# Patient Record
Sex: FEMALE | Race: WHITE | NOT HISPANIC OR LATINO | Employment: FULL TIME | URBAN - METROPOLITAN AREA
[De-identification: names, ages, dates, MRNs, and addresses within clinical notes are randomized per-mention and may not be internally consistent; named-entity substitution may affect disease eponyms.]

---

## 2013-05-17 LAB — HIV: NON REACTIVE

## 2019-02-07 ENCOUNTER — CLINICAL SUPPORT (OUTPATIENT)
Dept: FAMILY MEDICINE | Facility: CLINIC | Age: 39
End: 2019-02-07
Payer: COMMERCIAL

## 2019-09-16 ENCOUNTER — OFFICE VISIT (OUTPATIENT)
Dept: FAMILY MEDICINE | Facility: CLINIC | Age: 39
End: 2019-09-16
Payer: COMMERCIAL

## 2019-09-16 ENCOUNTER — LAB VISIT (OUTPATIENT)
Dept: LAB | Facility: HOSPITAL | Age: 39
End: 2019-09-16
Attending: FAMILY MEDICINE
Payer: COMMERCIAL

## 2019-09-16 VITALS
BODY MASS INDEX: 40.42 KG/M2 | HEART RATE: 83 BPM | OXYGEN SATURATION: 98 % | RESPIRATION RATE: 18 BRPM | SYSTOLIC BLOOD PRESSURE: 122 MMHG | DIASTOLIC BLOOD PRESSURE: 89 MMHG | WEIGHT: 219.63 LBS | HEIGHT: 62 IN

## 2019-09-16 DIAGNOSIS — Z23 NEED FOR VACCINATION: ICD-10-CM

## 2019-09-16 DIAGNOSIS — R73.9 HYPERGLYCEMIA: ICD-10-CM

## 2019-09-16 DIAGNOSIS — F41.9 ANXIETY: ICD-10-CM

## 2019-09-16 DIAGNOSIS — F41.9 ANXIETY: Primary | ICD-10-CM

## 2019-09-16 DIAGNOSIS — E06.3 HASHIMOTO'S THYROIDITIS: ICD-10-CM

## 2019-09-16 LAB
ALBUMIN SERPL BCP-MCNC: 3.9 G/DL (ref 3.5–5.2)
ALP SERPL-CCNC: 78 U/L (ref 55–135)
ALT SERPL W/O P-5'-P-CCNC: 38 U/L (ref 10–44)
ANION GAP SERPL CALC-SCNC: 9 MMOL/L (ref 8–16)
AST SERPL-CCNC: 22 U/L (ref 10–40)
BASOPHILS # BLD AUTO: 0.06 K/UL (ref 0–0.2)
BASOPHILS NFR BLD: 0.7 % (ref 0–1.9)
BILIRUB SERPL-MCNC: 0.6 MG/DL (ref 0.1–1)
BUN SERPL-MCNC: 14 MG/DL (ref 6–20)
CALCIUM SERPL-MCNC: 8.6 MG/DL (ref 8.7–10.5)
CHLORIDE SERPL-SCNC: 105 MMOL/L (ref 95–110)
CHOLEST SERPL-MCNC: 192 MG/DL (ref 120–199)
CHOLEST/HDLC SERPL: 4.3 {RATIO} (ref 2–5)
CO2 SERPL-SCNC: 22 MMOL/L (ref 23–29)
CREAT SERPL-MCNC: 0.8 MG/DL (ref 0.5–1.4)
DIFFERENTIAL METHOD: NORMAL
EOSINOPHIL # BLD AUTO: 0.2 K/UL (ref 0–0.5)
EOSINOPHIL NFR BLD: 2.6 % (ref 0–8)
ERYTHROCYTE [DISTWIDTH] IN BLOOD BY AUTOMATED COUNT: 11.9 % (ref 11.5–14.5)
EST. GFR  (AFRICAN AMERICAN): >60 ML/MIN/1.73 M^2
EST. GFR  (NON AFRICAN AMERICAN): >60 ML/MIN/1.73 M^2
ESTIMATED AVG GLUCOSE: 97 MG/DL (ref 68–131)
GLUCOSE SERPL-MCNC: 96 MG/DL (ref 70–110)
HBA1C MFR BLD HPLC: 5 % (ref 4.5–6.2)
HCT VFR BLD AUTO: 41.6 % (ref 37–48.5)
HDLC SERPL-MCNC: 45 MG/DL (ref 40–75)
HDLC SERPL: 23.4 % (ref 20–50)
HGB BLD-MCNC: 14.4 G/DL (ref 12–16)
IMM GRANULOCYTES # BLD AUTO: 0.03 K/UL (ref 0–0.04)
IMM GRANULOCYTES NFR BLD AUTO: 0.4 % (ref 0–0.5)
LDLC SERPL CALC-MCNC: 102.4 MG/DL (ref 63–159)
LYMPHOCYTES # BLD AUTO: 1.9 K/UL (ref 1–4.8)
LYMPHOCYTES NFR BLD: 23.8 % (ref 18–48)
MCH RBC QN AUTO: 30.8 PG (ref 27–31)
MCHC RBC AUTO-ENTMCNC: 34.6 G/DL (ref 32–36)
MCV RBC AUTO: 89 FL (ref 82–98)
MONOCYTES # BLD AUTO: 0.5 K/UL (ref 0.3–1)
MONOCYTES NFR BLD: 5.7 % (ref 4–15)
NEUTROPHILS # BLD AUTO: 5.4 K/UL (ref 1.8–7.7)
NEUTROPHILS NFR BLD: 66.8 % (ref 38–73)
NONHDLC SERPL-MCNC: 147 MG/DL
NRBC BLD-RTO: 0 /100 WBC
PLATELET # BLD AUTO: 326 K/UL (ref 150–350)
PMV BLD AUTO: 9.5 FL (ref 9.2–12.9)
POTASSIUM SERPL-SCNC: 4.1 MMOL/L (ref 3.5–5.1)
PROT SERPL-MCNC: 7.1 G/DL (ref 6–8.4)
RBC # BLD AUTO: 4.68 M/UL (ref 4–5.4)
SODIUM SERPL-SCNC: 136 MMOL/L (ref 136–145)
T4 FREE SERPL-MCNC: 0.8 NG/DL (ref 0.71–1.51)
TRIGL SERPL-MCNC: 223 MG/DL (ref 30–150)
TSH SERPL DL<=0.005 MIU/L-ACNC: 7.58 UIU/ML (ref 0.34–5.6)
WBC # BLD AUTO: 8.1 K/UL (ref 3.9–12.7)

## 2019-09-16 PROCEDURE — 36415 COLL VENOUS BLD VENIPUNCTURE: CPT

## 2019-09-16 PROCEDURE — 90686 IIV4 VACC NO PRSV 0.5 ML IM: CPT | Mod: S$GLB,,, | Performed by: FAMILY MEDICINE

## 2019-09-16 PROCEDURE — 99999 PR PBB SHADOW E&M-EST. PATIENT-LVL III: CPT | Mod: PBBFAC,,, | Performed by: FAMILY MEDICINE

## 2019-09-16 PROCEDURE — 99204 PR OFFICE/OUTPT VISIT, NEW, LEVL IV, 45-59 MIN: ICD-10-PCS | Mod: 25,S$GLB,, | Performed by: FAMILY MEDICINE

## 2019-09-16 PROCEDURE — 86376 MICROSOMAL ANTIBODY EACH: CPT

## 2019-09-16 PROCEDURE — 84439 ASSAY OF FREE THYROXINE: CPT

## 2019-09-16 PROCEDURE — 84443 ASSAY THYROID STIM HORMONE: CPT

## 2019-09-16 PROCEDURE — 90471 FLU VACCINE (QUAD) GREATER THAN OR EQUAL TO 3YO PRESERVATIVE FREE IM: ICD-10-PCS | Mod: S$GLB,,, | Performed by: FAMILY MEDICINE

## 2019-09-16 PROCEDURE — 80061 LIPID PANEL: CPT

## 2019-09-16 PROCEDURE — 3008F BODY MASS INDEX DOCD: CPT | Mod: S$GLB,,, | Performed by: FAMILY MEDICINE

## 2019-09-16 PROCEDURE — 3008F PR BODY MASS INDEX (BMI) DOCUMENTED: ICD-10-PCS | Mod: S$GLB,,, | Performed by: FAMILY MEDICINE

## 2019-09-16 PROCEDURE — 90686 FLU VACCINE (QUAD) GREATER THAN OR EQUAL TO 3YO PRESERVATIVE FREE IM: ICD-10-PCS | Mod: S$GLB,,, | Performed by: FAMILY MEDICINE

## 2019-09-16 PROCEDURE — 99999 PR PBB SHADOW E&M-EST. PATIENT-LVL III: ICD-10-PCS | Mod: PBBFAC,,, | Performed by: FAMILY MEDICINE

## 2019-09-16 PROCEDURE — 83036 HEMOGLOBIN GLYCOSYLATED A1C: CPT

## 2019-09-16 PROCEDURE — 80053 COMPREHEN METABOLIC PANEL: CPT

## 2019-09-16 PROCEDURE — 85025 COMPLETE CBC W/AUTO DIFF WBC: CPT

## 2019-09-16 PROCEDURE — 90471 IMMUNIZATION ADMIN: CPT | Mod: S$GLB,,, | Performed by: FAMILY MEDICINE

## 2019-09-16 PROCEDURE — 99204 OFFICE O/P NEW MOD 45 MIN: CPT | Mod: 25,S$GLB,, | Performed by: FAMILY MEDICINE

## 2019-09-16 RX ORDER — LEVOTHYROXINE SODIUM 112 UG/1
112 TABLET ORAL DAILY
Qty: 90 TABLET | Refills: 3 | Status: SHIPPED | OUTPATIENT
Start: 2019-09-16 | End: 2020-09-22 | Stop reason: SDUPTHER

## 2019-09-16 RX ORDER — BUPROPION HYDROCHLORIDE 150 MG/1
150 TABLET ORAL DAILY
Qty: 90 TABLET | Refills: 3 | Status: SHIPPED | OUTPATIENT
Start: 2019-09-16 | End: 2020-09-22 | Stop reason: SDUPTHER

## 2019-09-16 RX ORDER — LEVOTHYROXINE SODIUM 112 UG/1
112 TABLET ORAL DAILY
Refills: 1 | COMMUNITY
Start: 2019-06-22 | End: 2019-09-16 | Stop reason: SDUPTHER

## 2019-09-16 NOTE — PROGRESS NOTES
"Subjective:       Patient ID: Margo Griffin is a 39 y.o. female.    Chief Complaint: Establish Care (would like BW, Pap completed w/Dr. Arango); Anxiety; and Flu Vaccine    Establish care    Anxiety  Previously on wellbutrin  Stopped about 3 months ago  No si/hi  Started new job and anxiety is worse    Hypothyroidism  Hashimoto's  Has not taken meds in two months        Review of Systems   Constitutional: Negative for activity change, appetite change, chills, fatigue and fever.   HENT: Negative for congestion, dental problem, facial swelling, nosebleeds, postnasal drip, sinus pain, sore throat, trouble swallowing and voice change.    Eyes: Negative for pain, discharge and visual disturbance.   Respiratory: Negative for apnea, cough, chest tightness and shortness of breath.    Cardiovascular: Negative for chest pain and palpitations.   Gastrointestinal: Negative for abdominal pain, blood in stool, constipation and nausea.   Endocrine: Negative for cold intolerance, polydipsia and polyuria.   Genitourinary: Negative for difficulty urinating, enuresis and flank pain.   Musculoskeletal: Negative for arthralgias and back pain.   Skin: Negative for color change.   Allergic/Immunologic: Negative for environmental allergies and immunocompromised state.   Neurological: Negative for dizziness and light-headedness.   Hematological: Negative for adenopathy.   Psychiatric/Behavioral: Positive for dysphoric mood. Negative for agitation, behavioral problems and decreased concentration. The patient is nervous/anxious.    All other systems reviewed and are negative.        Reviewed family, medical, surgical, and social history.    Objective:      /89 (BP Location: Left arm, Patient Position: Sitting, BP Method: Medium (Automatic))   Pulse 83   Resp 18   Ht 5' 2" (1.575 m)   Wt 99.6 kg (219 lb 9.6 oz)   SpO2 98%   BMI 40.17 kg/m²   Physical Exam   Constitutional: She is oriented to person, place, and time. She appears " well-developed and well-nourished. No distress.   HENT:   Head: Normocephalic and atraumatic.   Nose: Nose normal.   Mouth/Throat: Oropharynx is clear and moist. No oropharyngeal exudate.   Eyes: Pupils are equal, round, and reactive to light. Conjunctivae and EOM are normal. No scleral icterus.   Neck: Normal range of motion. Neck supple. No thyromegaly present.   Cardiovascular: Normal rate, regular rhythm and normal heart sounds. Exam reveals no gallop and no friction rub.   No murmur heard.  Pulmonary/Chest: Effort normal and breath sounds normal. No respiratory distress. She has no wheezes. She has no rales. She exhibits no tenderness.   Abdominal: Soft. Bowel sounds are normal. She exhibits no distension. There is no tenderness. There is no guarding.   Musculoskeletal: Normal range of motion. She exhibits no edema, tenderness or deformity.   Lymphadenopathy:     She has no cervical adenopathy.   Neurological: She is alert and oriented to person, place, and time. She displays normal reflexes. No cranial nerve deficit or sensory deficit. She exhibits normal muscle tone.   Skin: Skin is warm and dry. No rash noted. She is not diaphoretic. No erythema. No pallor.   Psychiatric: She has a normal mood and affect. Her behavior is normal. Judgment and thought content normal.   Nursing note and vitals reviewed.      Assessment:       1. Anxiety    2. Hashimoto's thyroiditis    3. Hyperglycemia    4. Need for vaccination        Plan:       Anxiety  -     Influenza - Quadrivalent (PF)  -     buPROPion (WELLBUTRIN XL) 150 MG TB24 tablet; Take 1 tablet (150 mg total) by mouth once daily.  Dispense: 90 tablet; Refill: 3  -     levothyroxine (SYNTHROID) 112 MCG tablet; Take 1 tablet (112 mcg total) by mouth once daily.  Dispense: 90 tablet; Refill: 3  -     CBC auto differential; Future; Expected date: 09/16/2019  -     Comprehensive metabolic panel; Future; Expected date: 09/16/2019  -     Microalbumin/creatinine urine  ratio; Future  -     Lipid panel; Future; Expected date: 09/16/2019  -     TSH; Future; Expected date: 09/16/2019  -     T4, free; Future; Expected date: 09/16/2019  -     Hemoglobin A1c; Future; Expected date: 09/16/2019  -     Thyroid peroxidase antibody; Future; Expected date: 09/16/2019    Hashimoto's thyroiditis  -     Influenza - Quadrivalent (PF)  -     buPROPion (WELLBUTRIN XL) 150 MG TB24 tablet; Take 1 tablet (150 mg total) by mouth once daily.  Dispense: 90 tablet; Refill: 3  -     levothyroxine (SYNTHROID) 112 MCG tablet; Take 1 tablet (112 mcg total) by mouth once daily.  Dispense: 90 tablet; Refill: 3  -     CBC auto differential; Future; Expected date: 09/16/2019  -     Comprehensive metabolic panel; Future; Expected date: 09/16/2019  -     Microalbumin/creatinine urine ratio; Future  -     Lipid panel; Future; Expected date: 09/16/2019  -     TSH; Future; Expected date: 09/16/2019  -     T4, free; Future; Expected date: 09/16/2019  -     Hemoglobin A1c; Future; Expected date: 09/16/2019  -     Thyroid peroxidase antibody; Future; Expected date: 09/16/2019    Hyperglycemia  -     Influenza - Quadrivalent (PF)  -     buPROPion (WELLBUTRIN XL) 150 MG TB24 tablet; Take 1 tablet (150 mg total) by mouth once daily.  Dispense: 90 tablet; Refill: 3  -     levothyroxine (SYNTHROID) 112 MCG tablet; Take 1 tablet (112 mcg total) by mouth once daily.  Dispense: 90 tablet; Refill: 3  -     CBC auto differential; Future; Expected date: 09/16/2019  -     Comprehensive metabolic panel; Future; Expected date: 09/16/2019  -     Microalbumin/creatinine urine ratio; Future  -     Lipid panel; Future; Expected date: 09/16/2019  -     TSH; Future; Expected date: 09/16/2019  -     T4, free; Future; Expected date: 09/16/2019  -     Hemoglobin A1c; Future; Expected date: 09/16/2019  -     Thyroid peroxidase antibody; Future; Expected date: 09/16/2019    Need for vaccination  -     Influenza - Quadrivalent (PF)  -      buPROPion (WELLBUTRIN XL) 150 MG TB24 tablet; Take 1 tablet (150 mg total) by mouth once daily.  Dispense: 90 tablet; Refill: 3  -     levothyroxine (SYNTHROID) 112 MCG tablet; Take 1 tablet (112 mcg total) by mouth once daily.  Dispense: 90 tablet; Refill: 3  -     CBC auto differential; Future; Expected date: 09/16/2019  -     Comprehensive metabolic panel; Future; Expected date: 09/16/2019  -     Microalbumin/creatinine urine ratio; Future  -     Lipid panel; Future; Expected date: 09/16/2019  -     TSH; Future; Expected date: 09/16/2019  -     T4, free; Future; Expected date: 09/16/2019  -     Hemoglobin A1c; Future; Expected date: 09/16/2019  -     Thyroid peroxidase antibody; Future; Expected date: 09/16/2019    Restart meds  Recheck labs  Adjust as necessary        Risks, benefits, and side effects were discussed with the patient. All questions were answered to the fullest satisfaction of the patient, and pt verbalized understanding and agreement to treatment plan. Pt was to call with any new or worsening symptoms, or present to the ER.

## 2019-09-17 LAB — THYROPEROXIDASE IGG SERPL-ACNC: 209.9 IU/ML

## 2019-09-19 DIAGNOSIS — Z12.39 BREAST CANCER SCREENING: ICD-10-CM

## 2019-09-24 ENCOUNTER — TELEPHONE (OUTPATIENT)
Dept: FAMILY MEDICINE | Facility: CLINIC | Age: 39
End: 2019-09-24

## 2019-09-24 NOTE — TELEPHONE ENCOUNTER
Called pt x3, no answer, no VM. Dial tone changed to busy signal x3.        ----- Message from Laura Berrios sent at 9/24/2019 10:41 AM CDT -----  Type:  Patient Returning Call    Who Called:  Self   Who Left Message for Patient:    Does the patient know what this is regarding?:    Best Call Back Number:  [phone] Additional Information:

## 2019-09-30 ENCOUNTER — TELEPHONE (OUTPATIENT)
Dept: FAMILY MEDICINE | Facility: CLINIC | Age: 39
End: 2019-09-30

## 2019-09-30 NOTE — TELEPHONE ENCOUNTER
Called pt x3, no answer, no VM.      ----- Message from Consuelo Stringer sent at 9/30/2019  1:12 PM CDT -----  Contact: self  Placed call to pod, patient miss call from your office please call back at [phone] (home)

## 2019-10-07 ENCOUNTER — TELEPHONE (OUTPATIENT)
Dept: FAMILY MEDICINE | Facility: CLINIC | Age: 39
End: 2019-10-07

## 2020-01-22 ENCOUNTER — PATIENT OUTREACH (OUTPATIENT)
Dept: ADMINISTRATIVE | Facility: HOSPITAL | Age: 40
End: 2020-01-22

## 2020-01-22 RX ORDER — METFORMIN HYDROCHLORIDE 500 MG/1
500 TABLET, EXTENDED RELEASE ORAL
Qty: 90 TABLET | Refills: 3 | Status: SHIPPED | OUTPATIENT
Start: 2020-01-22 | End: 2020-09-22 | Stop reason: SDUPTHER

## 2020-01-22 NOTE — LETTER
FAX      AUTHORIZATION FOR RELEASE OF   CONFIDENTIAL INFORMATION            Dr. Arango      We are seeing Margo Griffin, date of birth 1980, in the clinic at [facility]. Brijesh Olivera MD is the patient's PCP. Margo Griffin has an outstanding lab/procedure at the time we reviewed her chart. In order to help keep her health information updated, she has authorized us to request the following medical record(s):        ( x )  MAMMOGRAM                                      (  )  COLONOSCOPY      ( x )  PAP SMEAR                                          (  )  OUTSIDE LAB RESULTS     (  )  DEXA SCAN                                          (  )  DIABETIC EYE EXAM            (  )  DIABETIC FOOT EXAM                        (  )  ENTIRE RECORD     (  )  OUTSIDE IMMUNIZATIONS                 (  )  _______________        Please fax records to [facility]  [phone]     If you have any questions, please contact Myranda at [phone].      Myranda Bray L.P.N. Clinical Care Coordinator  [address] [place] [address], [place] [address] [phone]

## 2020-05-06 ENCOUNTER — PATIENT OUTREACH (OUTPATIENT)
Dept: ADMINISTRATIVE | Facility: HOSPITAL | Age: 40
End: 2020-05-06

## 2020-05-06 ENCOUNTER — TELEPHONE (OUTPATIENT)
Dept: FAMILY MEDICINE | Facility: CLINIC | Age: 40
End: 2020-05-06

## 2020-05-08 ENCOUNTER — OFFICE VISIT (OUTPATIENT)
Dept: FAMILY MEDICINE | Facility: CLINIC | Age: 40
End: 2020-05-08
Payer: COMMERCIAL

## 2020-05-08 ENCOUNTER — TELEPHONE (OUTPATIENT)
Dept: FAMILY MEDICINE | Facility: CLINIC | Age: 40
End: 2020-05-08

## 2020-05-08 VITALS
SYSTOLIC BLOOD PRESSURE: 128 MMHG | HEART RATE: 86 BPM | RESPIRATION RATE: 16 BRPM | HEIGHT: 62 IN | DIASTOLIC BLOOD PRESSURE: 82 MMHG | OXYGEN SATURATION: 98 % | BODY MASS INDEX: 40.12 KG/M2 | WEIGHT: 218 LBS

## 2020-05-08 DIAGNOSIS — E06.3 HASHIMOTO'S THYROIDITIS: Primary | ICD-10-CM

## 2020-05-08 PROCEDURE — 99213 OFFICE O/P EST LOW 20 MIN: CPT | Mod: S$GLB,,, | Performed by: FAMILY MEDICINE

## 2020-05-08 PROCEDURE — 99999 PR PBB SHADOW E&M-EST. PATIENT-LVL III: ICD-10-PCS | Mod: PBBFAC,,, | Performed by: FAMILY MEDICINE

## 2020-05-08 PROCEDURE — 99213 PR OFFICE/OUTPT VISIT, EST, LEVL III, 20-29 MIN: ICD-10-PCS | Mod: S$GLB,,, | Performed by: FAMILY MEDICINE

## 2020-05-08 PROCEDURE — 3008F BODY MASS INDEX DOCD: CPT | Mod: S$GLB,,, | Performed by: FAMILY MEDICINE

## 2020-05-08 PROCEDURE — 3008F PR BODY MASS INDEX (BMI) DOCUMENTED: ICD-10-PCS | Mod: S$GLB,,, | Performed by: FAMILY MEDICINE

## 2020-05-08 PROCEDURE — 99999 PR PBB SHADOW E&M-EST. PATIENT-LVL III: CPT | Mod: PBBFAC,,, | Performed by: FAMILY MEDICINE

## 2020-05-08 NOTE — PROGRESS NOTES
"Subjective:       Patient ID: Margo Griffin is a 40 y.o. female.    Chief Complaint: Thyroid Problem    Thyroid issues  Worsening  Changes in skin  Changes in energy level  Otherwise feels fine    Review of Systems   Constitutional: Positive for activity change. Negative for appetite change, chills, fatigue and fever.   HENT: Negative for congestion, dental problem, facial swelling, nosebleeds, postnasal drip, sinus pain, sore throat, trouble swallowing and voice change.    Eyes: Negative for pain, discharge and visual disturbance.   Respiratory: Negative for apnea, cough, chest tightness and shortness of breath.    Cardiovascular: Negative for chest pain and palpitations.   Gastrointestinal: Negative for abdominal pain, blood in stool, constipation and nausea.   Endocrine: Negative for cold intolerance, polydipsia and polyuria.   Genitourinary: Negative for difficulty urinating, enuresis and flank pain.   Musculoskeletal: Negative for arthralgias and back pain.   Skin: Negative for color change.   Allergic/Immunologic: Negative for environmental allergies and immunocompromised state.   Neurological: Negative for dizziness and light-headedness.   Hematological: Negative for adenopathy.   Psychiatric/Behavioral: Negative for agitation, behavioral problems, decreased concentration and dysphoric mood. The patient is not nervous/anxious.    All other systems reviewed and are negative.        Reviewed family, medical, surgical, and social history.    Objective:      /82 (BP Location: Left arm, Patient Position: Sitting, BP Method: Medium (Automatic))   Pulse 86   Resp 16   Ht 5' 2" (1.575 m)   Wt 98.9 kg (218 lb)   SpO2 98%   BMI 39.87 kg/m²   Physical Exam   Constitutional: She is oriented to person, place, and time. She appears well-developed and well-nourished. No distress.   HENT:   Head: Normocephalic and atraumatic.   Nose: Nose normal.   Mouth/Throat: Oropharynx is clear and moist. No oropharyngeal " exudate.   Eyes: Pupils are equal, round, and reactive to light. Conjunctivae and EOM are normal. No scleral icterus.   Neck: Normal range of motion. Neck supple. No thyromegaly present.   Cardiovascular: Normal rate, regular rhythm and normal heart sounds. Exam reveals no gallop and no friction rub.   No murmur heard.  Pulmonary/Chest: Effort normal and breath sounds normal. No respiratory distress. She has no wheezes. She has no rales. She exhibits no tenderness.   Abdominal: Soft. Bowel sounds are normal. She exhibits no distension. There is no tenderness. There is no guarding.   Musculoskeletal: Normal range of motion. She exhibits no edema, tenderness or deformity.   Lymphadenopathy:     She has no cervical adenopathy.   Neurological: She is alert and oriented to person, place, and time. She displays normal reflexes. No cranial nerve deficit or sensory deficit. She exhibits normal muscle tone.   Skin: Skin is warm and dry. No rash noted. She is not diaphoretic. No erythema. No pallor.   Psychiatric: She has a normal mood and affect. Her behavior is normal. Judgment and thought content normal.   Nursing note and vitals reviewed.      Assessment:       1. Hashimoto's thyroiditis        Plan:       Hashimoto's thyroiditis  -     T4, free; Future; Expected date: 05/08/2020  -     TSH; Future; Expected date: 05/08/2020  -     US Soft Tissue Head Neck Thyroid; Future; Expected date: 05/08/2020            Risks, benefits, and side effects were discussed with the patient. All questions were answered to the fullest satisfaction of the patient, and pt verbalized understanding and agreement to treatment plan. Pt was to call with any new or worsening symptoms, or present to the ER.

## 2020-05-08 NOTE — TELEPHONE ENCOUNTER
----- Message from Joyce Quintero sent at 5/8/2020 12:10 PM CDT -----  Contact: Patient  Type:  Patient Returning Call    Who Called:  Patient  Who Left Message for Patient:  Carmita newman  Does the patient know what this is regarding?:  yes  Best Call Back Number:    Additional Information:  Call to pod, no answer. Patient is wanting to confirm she will be there for 1

## 2020-05-12 ENCOUNTER — LAB VISIT (OUTPATIENT)
Dept: LAB | Facility: HOSPITAL | Age: 40
End: 2020-05-12
Attending: FAMILY MEDICINE
Payer: COMMERCIAL

## 2020-05-12 ENCOUNTER — HOSPITAL ENCOUNTER (OUTPATIENT)
Dept: RADIOLOGY | Facility: HOSPITAL | Age: 40
Discharge: HOME | End: 2020-05-12
Attending: FAMILY MEDICINE
Payer: COMMERCIAL

## 2020-05-12 DIAGNOSIS — E06.3 HASHIMOTO'S THYROIDITIS: ICD-10-CM

## 2020-05-12 LAB
T4 FREE SERPL-MCNC: 1.22 NG/DL (ref 0.71–1.51)
TSH SERPL DL<=0.005 MIU/L-ACNC: 0.38 UIU/ML (ref 0.34–5.6)

## 2020-05-12 PROCEDURE — 76536 US EXAM OF HEAD AND NECK: CPT | Mod: TC,PN

## 2020-05-12 PROCEDURE — 76536 US EXAM OF HEAD AND NECK: CPT | Mod: 26,,, | Performed by: RADIOLOGY

## 2020-05-12 PROCEDURE — 36415 COLL VENOUS BLD VENIPUNCTURE: CPT

## 2020-05-12 PROCEDURE — 76536 US SOFT TISSUE HEAD NECK THYROID: ICD-10-PCS | Mod: 26,,, | Performed by: RADIOLOGY

## 2020-05-12 PROCEDURE — 84443 ASSAY THYROID STIM HORMONE: CPT

## 2020-05-12 PROCEDURE — 84439 ASSAY OF FREE THYROXINE: CPT

## 2020-05-13 ENCOUNTER — TELEPHONE (OUTPATIENT)
Dept: FAMILY MEDICINE | Facility: CLINIC | Age: 40
End: 2020-05-13

## 2020-05-13 DIAGNOSIS — E06.3 HASHIMOTO'S THYROIDITIS: Primary | ICD-10-CM

## 2020-05-13 NOTE — TELEPHONE ENCOUNTER
----- Message from Esha Brambila MA sent at 5/13/2020  3:03 PM CDT -----  Patient was informed of result note and verbalized understanding. She also is okay with being set up with an endocrinologist.

## 2020-06-23 ENCOUNTER — PATIENT OUTREACH (OUTPATIENT)
Dept: ADMINISTRATIVE | Facility: OTHER | Age: 40
End: 2020-06-23

## 2020-06-25 ENCOUNTER — OFFICE VISIT (OUTPATIENT)
Dept: ENDOCRINOLOGY | Facility: CLINIC | Age: 40
End: 2020-06-25
Payer: COMMERCIAL

## 2020-06-25 VITALS
TEMPERATURE: 98 F | SYSTOLIC BLOOD PRESSURE: 118 MMHG | HEART RATE: 97 BPM | HEIGHT: 62 IN | BODY MASS INDEX: 39.86 KG/M2 | DIASTOLIC BLOOD PRESSURE: 82 MMHG | WEIGHT: 216.63 LBS

## 2020-06-25 DIAGNOSIS — R53.83 FATIGUE, UNSPECIFIED TYPE: ICD-10-CM

## 2020-06-25 DIAGNOSIS — E04.2 MULTINODULAR GOITER: Primary | ICD-10-CM

## 2020-06-25 DIAGNOSIS — E06.3 HASHIMOTO'S THYROIDITIS: ICD-10-CM

## 2020-06-25 DIAGNOSIS — E66.9 CLASS 2 OBESITY WITHOUT SERIOUS COMORBIDITY WITH BODY MASS INDEX (BMI) OF 39.0 TO 39.9 IN ADULT, UNSPECIFIED OBESITY TYPE: ICD-10-CM

## 2020-06-25 PROBLEM — E66.812 CLASS 2 OBESITY WITHOUT SERIOUS COMORBIDITY WITH BODY MASS INDEX (BMI) OF 39.0 TO 39.9 IN ADULT: Status: ACTIVE | Noted: 2020-06-25

## 2020-06-25 PROCEDURE — 99204 OFFICE O/P NEW MOD 45 MIN: CPT | Mod: S$GLB,,, | Performed by: INTERNAL MEDICINE

## 2020-06-25 PROCEDURE — 99204 PR OFFICE/OUTPT VISIT, NEW, LEVL IV, 45-59 MIN: ICD-10-PCS | Mod: S$GLB,,, | Performed by: INTERNAL MEDICINE

## 2020-06-25 PROCEDURE — 99999 PR PBB SHADOW E&M-EST. PATIENT-LVL IV: CPT | Mod: PBBFAC,,, | Performed by: INTERNAL MEDICINE

## 2020-06-25 PROCEDURE — 99999 PR PBB SHADOW E&M-EST. PATIENT-LVL IV: ICD-10-PCS | Mod: PBBFAC,,, | Performed by: INTERNAL MEDICINE

## 2020-06-25 NOTE — ASSESSMENT & PLAN NOTE
Pt with trouble swallowing sometimes for a few months.   - US with multiple nodules, most <1cm   - largest 1.3 cm isthmus, on personal review of the images it appears mostly isoechoic.   - discussed with patient this nodule doesn't meet FNA criteria yet, would need FNA if over 1.5 cm   - as well, with hashimoto's the thyroid will be very heterogenous, some of the ill-defined nodules may not even be there next year   - so, plan to repeat US after 1 year. Or sooner if patient feels swallowing symptoms are getting worse. Discussed that while it's less likely a nodule of that size would cause compression symptoms, since it is in the isthmus if it's growing that could be a culprit. However at this time, suspicion isn't high enough to recommend a surgery.   - symptoms could also be related to post-nasal drip/reflux

## 2020-06-25 NOTE — ASSESSMENT & PLAN NOTE
TPO elevated 2019   - on levothyroxine 112   - last TSH, free T4 normal   - clinically, doesn't have consistent symptoms pointing towards hyperthyroid or hypothyroid   - continue same dose, recheck in 3 months to ensure remains stable (TSH is pretty low, with pt reporting anxiety hx, sometimes diarrhea and feeling hot want to be sure she doesn't move towards iatrogenic hyperthyroidism)

## 2020-06-25 NOTE — PROGRESS NOTES
Subjective:      Chief Complaint: Thyroid Nodule and Hypothyroidism      HPI: Margo Griffin is a 40 y.o. female who is here for an initial evaluation for thyroid.    Pt noted trouble swallowing. Intermittent, not really getting better/worse, mostly the same.     Had US with PCP which showed nodules prompting referral.   US 5/12/2020: small nodules bilaterally   - Largest 1.3x0.47x0.98 cm in the isthmus. Heterogenous nodule, mostly isoechoic but some hypoechoic appearing areas.    Also has hx hypothyroid since around 2018 or 2019.   TPO elevated 9/2019    Lab Results   Component Value Date    TSH 0.382 05/12/2020    FREET4 1.22 05/12/2020     On 112 mcg/day levothyroxine. No missed doses/running out.    Today, pt reports still having the swallowing trouble.  Skin changes. Decreased mood, weight gain. Occasional diarrhea. No constipation. Brain fog. More forgetful the last few months. Does have reflux symptoms fairly often. Also gets postnasal drip seasonally.    Reviewed past medical, family, social history and updated as appropriate.    Review of Systems   Constitutional: Positive for fatigue and unexpected weight change.   HENT: Positive for trouble swallowing.    Eyes: Negative for visual disturbance.   Respiratory: Negative for shortness of breath.         Some trouble breathing when laying flat   Cardiovascular: Negative for palpitations.   Gastrointestinal: Positive for diarrhea. Negative for constipation.   Endocrine: Negative for cold intolerance.        Hot flashes   Genitourinary: Negative for frequency. Menstrual problem: had IUD, minimal cycles lately.   Neurological: Positive for numbness (sometimes in arm/feet). Negative for light-headedness.        Brain fog   Psychiatric/Behavioral: Negative for sleep disturbance.        Sometimes panic attacks     Objective:     Vitals:    06/25/20 0808   BP: 118/82   Pulse: 97   Temp: 98.3 °F (36.8 °C)     BP Readings from Last 5 Encounters:   06/25/20 118/82    05/08/20 128/82   09/16/19 122/89     Physical Exam  Vitals signs reviewed.   Constitutional:       Appearance: She is well-developed. She is obese.   HENT:      Head: Normocephalic and atraumatic.   Neck:      Musculoskeletal: Normal range of motion and neck supple.      Thyroid: No thyromegaly.      Comments: Isthmus nodule, nontender  Cardiovascular:      Rate and Rhythm: Normal rate and regular rhythm.      Heart sounds: No murmur.   Pulmonary:      Effort: Pulmonary effort is normal.      Breath sounds: Normal breath sounds.   Abdominal:      General: There is no distension.      Palpations: Abdomen is soft. There is no mass.      Tenderness: There is no abdominal tenderness.   Musculoskeletal: Normal range of motion.         General: No tenderness.   Neurological:      Mental Status: She is alert and oriented to person, place, and time.   Psychiatric:         Judgment: Judgment normal.       Wt Readings from Last 5 Encounters:   06/25/20 0808 98.2 kg (216 lb 9.6 oz)   05/08/20 1256 98.9 kg (218 lb)   09/16/19 0725 99.6 kg (219 lb 9.6 oz)       Lab Results   Component Value Date    HGBA1C 5.0 09/16/2019     Lab Results   Component Value Date    CHOL 192 09/16/2019    HDL 45 09/16/2019    LDLCALC 102.4 09/16/2019    TRIG 223 (H) 09/16/2019    CHOLHDL 23.4 09/16/2019     Lab Results   Component Value Date     09/16/2019    K 4.1 09/16/2019     09/16/2019    CO2 22 (L) 09/16/2019    GLU 96 09/16/2019    BUN 14 09/16/2019    CREATININE 0.8 09/16/2019    CALCIUM 8.6 (L) 09/16/2019    PROT 7.1 09/16/2019    ALBUMIN 3.9 09/16/2019    BILITOT 0.6 09/16/2019    ALKPHOS 78 09/16/2019    AST 22 09/16/2019    ALT 38 09/16/2019    ANIONGAP 9 09/16/2019    ESTGFRAFRICA >60.0 09/16/2019    EGFRNONAA >60.0 09/16/2019    TSH 0.382 05/12/2020      Lab Results   Component Value Date    MICALBCREAT 8.4 09/16/2019       Assessment/Plan:     Multinodular goiter  Pt with trouble swallowing sometimes for a few months.    - US with multiple nodules, most <1cm   - largest 1.3 cm isthmus, on personal review of the images it appears mostly isoechoic.   - discussed with patient this nodule doesn't meet FNA criteria yet, would need FNA if over 1.5 cm   - as well, with hashimoto's the thyroid will be very heterogenous, some of the ill-defined nodules may not even be there next year   - so, plan to repeat US after 1 year. Or sooner if patient feels swallowing symptoms are getting worse. Discussed that while it's less likely a nodule of that size would cause compression symptoms, since it is in the isthmus if it's growing that could be a culprit. However at this time, suspicion isn't high enough to recommend a surgery.   - symptoms could also be related to post-nasal drip/reflux      Hashimoto's thyroiditis  TPO elevated 2019   - on levothyroxine 112   - last TSH, free T4 normal   - clinically, doesn't have consistent symptoms pointing towards hyperthyroid or hypothyroid   - continue same dose, recheck in 3 months to ensure remains stable (TSH is pretty low, with pt reporting anxiety hx, sometimes diarrhea and feeling hot want to be sure she doesn't move towards iatrogenic hyperthyroidism)      Class 2 obesity without serious comorbidity with body mass index (BMI) of 39.0 to 39.9 in adult  bmi 39   - with prediabetes   - pt on metformin already   - has lost a few lbs in the last several months   - briefly considered weight loss medications, but with concerns about anxiety wouldn't really want to use phentermine. Other meds not covered.   - monitor weight for now        Follow up in about 1 year (around 6/25/2021) for lab review, imaging review, further monitoring.      Brice Hernandez MD  Endocrinology

## 2020-06-25 NOTE — ASSESSMENT & PLAN NOTE
bmi 39   - with prediabetes   - pt on metformin already   - has lost a few lbs in the last several months   - briefly considered weight loss medications, but with concerns about anxiety wouldn't really want to use phentermine. Other meds not covered.   - monitor weight for now

## 2020-06-25 NOTE — PATIENT INSTRUCTIONS
For the thyroid, your nodules were all fairly small. Unlikely for them to be causing these symptoms.   - Repeat ultrasound after 1 year to ensure they aren't growing    - or sooner, if you feel like the symptoms are getting worse.    Continue levothyroxine, recheck blood test in 3 months to ensure dose is still okay.      For the weight, keep trying to work on the exercise, diet/portion sizes. Metformin can help too.      Common Thyroid Problems    The thyroid is a gland in the neck. It makes thyroid hormone. A hormone is a chemical messenger for the body. If there is a problem with the thyroid, the level of thyroid hormone may change. This can lead to symptoms.  Hypothyroidism  This is when the thyroid gland doesnt make enough hormone. The most common cause of hypothyroidism is Hashimoto thyroiditis. This occurs when the bodys immune system attacks the thyroid gland. Hypothyroidism may also occur if theres a severe deficiency of iodine in the body. The thyroid needs iodine to make hormone. Problems with the pituitary gland can lead to not enough production of thyroid hormone. Hypothyroidism will also occur if the thyroid gland is removed during surgery.  Common symptoms include:  · Low energy and tiredness  · Depression  · Feeling cold  · Muscle pain  · Slowed thinking      · Constipation  · Heavier menstrual periods with prolonged bleeding   · Weight gain  · Dry and brittle skin, hair, nails  · Excessive sleepiness  Hyperthyroidism  This is when the thyroid gland makes too much hormone. The most common cause is Graves disease. This is due to the bodys immune system telling the thyroid to make too much hormone. Another cause is a small bump (nodule) in the thyroid gland. This can cause hyperthyroidism if the cells in the nodule make too much thyroid hormone and stop hormone production in the rest of the thyroid gland.  Common symptoms include:  · Shaking, nervousness, irritability  · Feeling hot  · A rapid,  irregular heartbeat  · Muscle weakness, fatigue  · More frequent bowel movements  · [place], lighter menstrual periods  · Weight loss  · Hair loss  · Bulging eyes  Nodules  Nodules are lumps of tissue in the thyroid gland. Most often, the cause of nodules isnt known. But they may be more common in people whove had medical radiation to the head or neck. Sometimes nodules can be felt on the outside of the neck. Most of the time, cause no symptoms and dont affect the amount of thyroid hormone. Most nodules are not cancer. But sometimes a nodule may be cancer.  What is a goiter?  A goiter is the enlargement of the thyroid gland. When the gland enlarges, you may see or feel a swelling on your neck. A goiter can develop in someone with a normal thyroid, overactive thyroid, or underactive thyroid.   Date Last Reviewed: 11/1/2016 © 2000-2017 [employer], [employer]. [address] [place] [address], [place] [address]. All rights reserved. This information is not intended as a substitute for professional medical care. Always follow your healthcare professional's instructions.

## 2020-06-25 NOTE — LETTER
June 25, 2020      Brijesh Olivera MD  [address]  [place] [address]           [place] [facility]  [address]  [place] [address]  Phone: [phone]          Patient: Margo Griffin   MR Number: [id number]   YOB: 1980   Date of Visit: 6/25/2020       Dear Dr. Brijesh Olivera:    Thank you for referring Margo Griffin to me for evaluation. Attached you will find relevant portions of my assessment and plan of care.    If you have questions, please do not hesitate to call me. I look forward to following Margo Griffin along with you.    Sincerely,    Brice Hernandez MD    Enclosure  CC:  No Recipients    If you would like to receive this communication electronically, please contact [email or web address] or [phone] to request more information on [email or web address] Link access.    For providers and/or their staff who would like to refer a patient to [facility], please contact us through our one-stop-shop provider referral line, [facility], at [phone].    If you feel you have received this communication in error or would no longer like to receive these types of communications, please e-mail [email or web address]

## 2020-09-22 DIAGNOSIS — F41.9 ANXIETY: ICD-10-CM

## 2020-09-22 DIAGNOSIS — R73.9 HYPERGLYCEMIA: ICD-10-CM

## 2020-09-22 DIAGNOSIS — Z23 NEED FOR VACCINATION: ICD-10-CM

## 2020-09-22 DIAGNOSIS — E06.3 HASHIMOTO'S THYROIDITIS: ICD-10-CM

## 2020-09-22 RX ORDER — BUPROPION HYDROCHLORIDE 150 MG/1
150 TABLET ORAL DAILY
Qty: 90 TABLET | Refills: 3 | Status: SHIPPED | OUTPATIENT
Start: 2020-09-22 | End: 2021-08-11 | Stop reason: SDUPTHER

## 2020-09-22 RX ORDER — LEVOTHYROXINE SODIUM 112 UG/1
112 TABLET ORAL DAILY
Qty: 90 TABLET | Refills: 3 | Status: SHIPPED | OUTPATIENT
Start: 2020-09-22 | End: 2021-08-11 | Stop reason: SDUPTHER

## 2020-09-22 RX ORDER — METFORMIN HYDROCHLORIDE 500 MG/1
500 TABLET, EXTENDED RELEASE ORAL
Qty: 90 TABLET | Refills: 3 | Status: SHIPPED | OUTPATIENT
Start: 2020-09-22 | End: 2021-02-24 | Stop reason: SDUPTHER

## 2020-09-22 NOTE — TELEPHONE ENCOUNTER
----- Message from Roxana Reynolds sent at 9/22/2020  1:07 PM CDT -----  Regarding: refill /new Rx  Contact: patient  Type:  RX Refill Request  Who Called:  patient  Refill or New Rx:  Refill  RX Name and Strength:    1)buPROPion (WELLBUTRIN XL) 150 MG TB24 tablet  2)levothyroxine (SYNTHROID) 112 MCG tablet  3)metFORMIN (GLUCOPHAGE-XR) 500 MG 24 hr tablet  How is the patient currently taking it? (ex. 1XDay): ?  Is this a 30 day or 90 day RX:  ?  Preferred Pharmacy with phone number:    [facility]/pharmacy #[id number] - [place], [place] - [address]  [address] [place] [address]  Phone: [phone] Fax: [phone]  Local or Mail Order:  local  Ordering Provider:  flori Haynes Call Back Number:  na  Additional Information:  na

## 2020-09-30 ENCOUNTER — LAB VISIT (OUTPATIENT)
Dept: LAB | Facility: HOSPITAL | Age: 40
End: 2020-09-30
Attending: INTERNAL MEDICINE
Payer: COMMERCIAL

## 2020-09-30 DIAGNOSIS — E06.3 HASHIMOTO'S THYROIDITIS: ICD-10-CM

## 2020-09-30 DIAGNOSIS — R53.83 FATIGUE, UNSPECIFIED TYPE: ICD-10-CM

## 2020-09-30 PROCEDURE — 84439 ASSAY OF FREE THYROXINE: CPT

## 2020-09-30 PROCEDURE — 84443 ASSAY THYROID STIM HORMONE: CPT

## 2020-09-30 PROCEDURE — 82607 VITAMIN B-12: CPT

## 2020-09-30 PROCEDURE — 36415 COLL VENOUS BLD VENIPUNCTURE: CPT | Mod: PO

## 2020-10-01 DIAGNOSIS — E06.3 HASHIMOTO'S THYROIDITIS: Primary | ICD-10-CM

## 2020-10-01 LAB
T4 FREE SERPL-MCNC: 1.18 NG/DL (ref 0.71–1.51)
TSH SERPL DL<=0.005 MIU/L-ACNC: 1.11 UIU/ML (ref 0.4–4)
VIT B12 SERPL-MCNC: 431 PG/ML (ref 210–950)

## 2020-10-05 ENCOUNTER — PATIENT MESSAGE (OUTPATIENT)
Dept: ADMINISTRATIVE | Facility: HOSPITAL | Age: 40
End: 2020-10-05

## 2020-10-14 ENCOUNTER — PATIENT OUTREACH (OUTPATIENT)
Dept: ADMINISTRATIVE | Facility: HOSPITAL | Age: 40
End: 2020-10-14

## 2020-10-14 NOTE — PROGRESS NOTES
Care Everywhere reviewed.   [facility] and [facility] reviewed.  HM modifier adjusted and HM updated.         NASEEM

## 2020-12-04 DIAGNOSIS — Z12.31 OTHER SCREENING MAMMOGRAM: ICD-10-CM

## 2020-12-07 ENCOUNTER — PATIENT MESSAGE (OUTPATIENT)
Dept: FAMILY MEDICINE | Facility: CLINIC | Age: 40
End: 2020-12-07

## 2021-01-04 ENCOUNTER — PATIENT MESSAGE (OUTPATIENT)
Dept: ADMINISTRATIVE | Facility: HOSPITAL | Age: 41
End: 2021-01-04

## 2021-02-24 RX ORDER — METFORMIN HYDROCHLORIDE 500 MG/1
500 TABLET, EXTENDED RELEASE ORAL
Qty: 90 TABLET | Refills: 3 | Status: SHIPPED | OUTPATIENT
Start: 2021-02-24 | End: 2021-08-11 | Stop reason: SDUPTHER

## 2021-03-04 DIAGNOSIS — Z11.59 NEED FOR HEPATITIS C SCREENING TEST: ICD-10-CM

## 2021-04-07 ENCOUNTER — PATIENT MESSAGE (OUTPATIENT)
Dept: ADMINISTRATIVE | Facility: HOSPITAL | Age: 41
End: 2021-04-07

## 2021-05-18 ENCOUNTER — HOSPITAL ENCOUNTER (OUTPATIENT)
Dept: RADIOLOGY | Facility: HOSPITAL | Age: 41
Discharge: HOME | End: 2021-05-18
Attending: FAMILY MEDICINE
Payer: COMMERCIAL

## 2021-05-18 VITALS — WEIGHT: 216 LBS | BODY MASS INDEX: 39.75 KG/M2 | HEIGHT: 62 IN

## 2021-05-18 DIAGNOSIS — Z12.31 OTHER SCREENING MAMMOGRAM: ICD-10-CM

## 2021-05-18 PROCEDURE — 77067 MAMMO DIGITAL SCREENING BILAT WITH TOMO: ICD-10-PCS | Mod: 26,,, | Performed by: RADIOLOGY

## 2021-05-18 PROCEDURE — 77067 SCR MAMMO BI INCL CAD: CPT | Mod: TC

## 2021-05-18 PROCEDURE — 77063 BREAST TOMOSYNTHESIS BI: CPT | Mod: 26,,, | Performed by: RADIOLOGY

## 2021-05-18 PROCEDURE — 77067 SCR MAMMO BI INCL CAD: CPT | Mod: 26,,, | Performed by: RADIOLOGY

## 2021-05-18 PROCEDURE — 77063 MAMMO DIGITAL SCREENING BILAT WITH TOMO: ICD-10-PCS | Mod: 26,,, | Performed by: RADIOLOGY

## 2021-06-21 ENCOUNTER — HOSPITAL ENCOUNTER (OUTPATIENT)
Dept: RADIOLOGY | Facility: HOSPITAL | Age: 41
Discharge: HOME | End: 2021-06-21
Attending: INTERNAL MEDICINE
Payer: COMMERCIAL

## 2021-06-21 DIAGNOSIS — E06.3 HASHIMOTO'S THYROIDITIS: ICD-10-CM

## 2021-06-21 PROCEDURE — 76536 US SOFT TISSUE HEAD NECK THYROID: ICD-10-PCS | Mod: 26,,, | Performed by: RADIOLOGY

## 2021-06-21 PROCEDURE — 76536 US EXAM OF HEAD AND NECK: CPT | Mod: 26,,, | Performed by: RADIOLOGY

## 2021-06-21 PROCEDURE — 76536 US EXAM OF HEAD AND NECK: CPT | Mod: TC,PN

## 2021-06-30 ENCOUNTER — OFFICE VISIT (OUTPATIENT)
Dept: DERMATOLOGY | Facility: CLINIC | Age: 41
End: 2021-06-30
Payer: COMMERCIAL

## 2021-06-30 DIAGNOSIS — L81.4 SOLAR LENTIGINOSIS: ICD-10-CM

## 2021-06-30 DIAGNOSIS — D48.5 NEOPLASM OF UNCERTAIN BEHAVIOR OF SKIN: Primary | ICD-10-CM

## 2021-06-30 DIAGNOSIS — D22.9 MULTIPLE BENIGN NEVI: ICD-10-CM

## 2021-06-30 DIAGNOSIS — L82.1 SEBORRHEIC KERATOSIS: ICD-10-CM

## 2021-06-30 PROCEDURE — 99999 PR PBB SHADOW E&M-EST. PATIENT-LVL III: CPT | Mod: PBBFAC,,, | Performed by: DERMATOLOGY

## 2021-06-30 PROCEDURE — 88305 TISSUE EXAM BY PATHOLOGIST: CPT | Performed by: PATHOLOGY

## 2021-06-30 PROCEDURE — 99203 PR OFFICE/OUTPT VISIT, NEW, LEVL III, 30-44 MIN: ICD-10-PCS | Mod: 25,S$GLB,, | Performed by: DERMATOLOGY

## 2021-06-30 PROCEDURE — 88305 TISSUE EXAM BY PATHOLOGIST: CPT | Mod: 26,,, | Performed by: PATHOLOGY

## 2021-06-30 PROCEDURE — 11103 PR TANGENTIAL BIOPSY, SKIN, EA ADDTL LESION: ICD-10-PCS | Mod: S$GLB,,, | Performed by: DERMATOLOGY

## 2021-06-30 PROCEDURE — 88305 TISSUE EXAM BY PATHOLOGIST: ICD-10-PCS | Mod: 26,,, | Performed by: PATHOLOGY

## 2021-06-30 PROCEDURE — 11102 PR TANGENTIAL BIOPSY, SKIN, SINGLE LESION: ICD-10-PCS | Mod: S$GLB,,, | Performed by: DERMATOLOGY

## 2021-06-30 PROCEDURE — 11103 TANGNTL BX SKIN EA SEP/ADDL: CPT | Mod: S$GLB,,, | Performed by: DERMATOLOGY

## 2021-06-30 PROCEDURE — 11102 TANGNTL BX SKIN SINGLE LES: CPT | Mod: S$GLB,,, | Performed by: DERMATOLOGY

## 2021-06-30 PROCEDURE — 99203 OFFICE O/P NEW LOW 30 MIN: CPT | Mod: 25,S$GLB,, | Performed by: DERMATOLOGY

## 2021-06-30 PROCEDURE — 99999 PR PBB SHADOW E&M-EST. PATIENT-LVL III: ICD-10-PCS | Mod: PBBFAC,,, | Performed by: DERMATOLOGY

## 2021-06-30 RX ORDER — PHENTERMINE HYDROCHLORIDE 37.5 MG/1
37.5 TABLET ORAL EVERY MORNING
COMMUNITY
Start: 2021-03-23

## 2021-07-02 LAB
FINAL PATHOLOGIC DIAGNOSIS: NORMAL
GROSS: NORMAL
Lab: NORMAL
MICROSCOPIC EXAM: NORMAL

## 2021-07-06 ENCOUNTER — PATIENT MESSAGE (OUTPATIENT)
Dept: ENDOCRINOLOGY | Facility: CLINIC | Age: 41
End: 2021-07-06

## 2021-08-11 DIAGNOSIS — Z23 NEED FOR VACCINATION: ICD-10-CM

## 2021-08-11 DIAGNOSIS — R73.9 HYPERGLYCEMIA: ICD-10-CM

## 2021-08-11 DIAGNOSIS — E06.3 HASHIMOTO'S THYROIDITIS: ICD-10-CM

## 2021-08-11 DIAGNOSIS — F41.9 ANXIETY: ICD-10-CM

## 2021-08-11 RX ORDER — METFORMIN HYDROCHLORIDE 500 MG/1
500 TABLET, EXTENDED RELEASE ORAL
Qty: 90 TABLET | Refills: 3 | Status: SHIPPED | OUTPATIENT
Start: 2021-08-11 | End: 2021-12-26 | Stop reason: SDUPTHER

## 2021-08-11 RX ORDER — LEVOTHYROXINE SODIUM 112 UG/1
112 TABLET ORAL DAILY
Qty: 90 TABLET | Refills: 3 | Status: SHIPPED | OUTPATIENT
Start: 2021-08-11

## 2021-08-11 RX ORDER — BUPROPION HYDROCHLORIDE 150 MG/1
150 TABLET ORAL DAILY
Qty: 90 TABLET | Refills: 3 | Status: SHIPPED | OUTPATIENT
Start: 2021-08-11 | End: 2022-05-07 | Stop reason: SDUPTHER

## 2021-12-07 ENCOUNTER — TELEPHONE (OUTPATIENT)
Dept: FAMILY MEDICINE | Facility: CLINIC | Age: 41
End: 2021-12-07
Payer: COMMERCIAL

## 2021-12-27 RX ORDER — METFORMIN HYDROCHLORIDE 500 MG/1
500 TABLET, EXTENDED RELEASE ORAL
Qty: 90 TABLET | Refills: 3 | Status: SHIPPED | OUTPATIENT
Start: 2021-12-27 | End: 2022-10-05 | Stop reason: SDUPTHER

## 2022-01-11 ENCOUNTER — PATIENT MESSAGE (OUTPATIENT)
Dept: ADMINISTRATIVE | Facility: HOSPITAL | Age: 42
End: 2022-01-11
Payer: COMMERCIAL

## 2022-05-07 DIAGNOSIS — E06.3 HASHIMOTO'S THYROIDITIS: ICD-10-CM

## 2022-05-07 DIAGNOSIS — F41.9 ANXIETY: ICD-10-CM

## 2022-05-07 DIAGNOSIS — Z23 NEED FOR VACCINATION: ICD-10-CM

## 2022-05-07 DIAGNOSIS — R73.9 HYPERGLYCEMIA: ICD-10-CM

## 2022-05-09 RX ORDER — BUPROPION HYDROCHLORIDE 150 MG/1
150 TABLET ORAL DAILY
Qty: 90 TABLET | Refills: 3 | Status: SHIPPED | OUTPATIENT
Start: 2022-05-09 | End: 2022-10-05 | Stop reason: SDUPTHER

## 2022-05-22 ENCOUNTER — PATIENT MESSAGE (OUTPATIENT)
Dept: ENDOCRINOLOGY | Facility: CLINIC | Age: 42
End: 2022-05-22
Payer: COMMERCIAL

## 2022-05-31 ENCOUNTER — PATIENT MESSAGE (OUTPATIENT)
Dept: ADMINISTRATIVE | Facility: HOSPITAL | Age: 42
End: 2022-05-31
Payer: COMMERCIAL

## 2022-09-15 RX ORDER — METFORMIN HYDROCHLORIDE 500 MG/1
500 TABLET, EXTENDED RELEASE ORAL
Qty: 90 TABLET | Refills: 3 | Status: CANCELLED | OUTPATIENT
Start: 2022-09-15 | End: 2023-09-10

## 2022-09-19 ENCOUNTER — PATIENT MESSAGE (OUTPATIENT)
Dept: FAMILY MEDICINE | Facility: CLINIC | Age: 42
End: 2022-09-19
Payer: COMMERCIAL

## 2022-09-19 ENCOUNTER — TELEPHONE (OUTPATIENT)
Dept: FAMILY MEDICINE | Facility: CLINIC | Age: 42
End: 2022-09-19
Payer: COMMERCIAL

## 2022-09-19 ENCOUNTER — TELEPHONE (OUTPATIENT)
Dept: ENDOCRINOLOGY | Facility: CLINIC | Age: 42
End: 2022-09-19
Payer: COMMERCIAL

## 2022-09-19 NOTE — TELEPHONE ENCOUNTER
----- Message from Odette Olivier sent at 9/19/2022  2:09 PM CDT -----  Regarding: Thyroid Nodules  Contact: Margo Griffin  Patient is calling to schedule an appointment.  Has been over a year and needs a follow up.  Next available is in January and needs something sooner.  She is gaining weight, feeling fatigued, just generally does not feel well and is very concerned.  Please call patient at [phone].

## 2022-09-19 NOTE — TELEPHONE ENCOUNTER
----- Message from Odette Olivier sent at 9/19/2022  4:31 PM CDT -----  Contact: Margo Griffin  Patient is calling for a sooner appointment than the next available on 12-23-22.  Please call patient at   [phone]

## 2023-04-11 ENCOUNTER — TELEPHONE (OUTPATIENT)
Dept: ENDOCRINOLOGY | Facility: CLINIC | Age: 43
End: 2023-04-11
Payer: COMMERCIAL

## 2023-04-11 NOTE — TELEPHONE ENCOUNTER
Attempted to contact Ms. Savagekinza to schedule an appt. but there was no answer. A VM was left to call/message the clinic @ [phone] if assistance is still required. Was informed that Dr. Hernandez will be leaving [facility] in June 2023.

## 2024-04-01 LAB
HUMAN PAPILLOMAVIRUS (HPV): NORMAL
PAP RECOMMENDATION EXT: NORMAL
PAP SMEAR: NORMAL

## 2024-04-09 ENCOUNTER — OFFICE VISIT (OUTPATIENT)
Dept: DERMATOLOGY | Facility: CLINIC | Age: 44
End: 2024-04-09
Payer: COMMERCIAL

## 2024-04-09 VITALS — WEIGHT: 216.06 LBS | HEIGHT: 62 IN | RESPIRATION RATE: 18 BRPM | BODY MASS INDEX: 39.76 KG/M2

## 2024-04-09 DIAGNOSIS — D22.9 MULTIPLE BENIGN NEVI: ICD-10-CM

## 2024-04-09 DIAGNOSIS — Z12.83 SKIN CANCER SCREENING: ICD-10-CM

## 2024-04-09 DIAGNOSIS — R20.2 NOTALGIA PARESTHETICA: Primary | ICD-10-CM

## 2024-04-09 DIAGNOSIS — L82.1 SEBORRHEIC KERATOSES: ICD-10-CM

## 2024-04-09 DIAGNOSIS — L73.8 SEBACEOUS HYPERPLASIA: ICD-10-CM

## 2024-04-09 PROCEDURE — 3008F BODY MASS INDEX DOCD: CPT | Mod: CPTII,S$GLB,, | Performed by: DERMATOLOGY

## 2024-04-09 PROCEDURE — 99999 PR PBB SHADOW E&M-EST. PATIENT-LVL III: CPT | Mod: PBBFAC,,, | Performed by: DERMATOLOGY

## 2024-04-09 PROCEDURE — 1160F RVW MEDS BY RX/DR IN RCRD: CPT | Mod: CPTII,S$GLB,, | Performed by: DERMATOLOGY

## 2024-04-09 PROCEDURE — 99213 OFFICE O/P EST LOW 20 MIN: CPT | Mod: S$GLB,,, | Performed by: DERMATOLOGY

## 2024-04-09 PROCEDURE — 1159F MED LIST DOCD IN RCRD: CPT | Mod: CPTII,S$GLB,, | Performed by: DERMATOLOGY

## 2024-04-09 NOTE — PROGRESS NOTES
Subjective:      Patient ID:  Margo Griffin is a 44 y.o. female who presents for   Chief Complaint   Patient presents with    Skin Check     HPI  Patient present for skin check.  C/o spot on back that's itchy.  noticed several growths in area of itch. No rash.   2 yr ago    Final Pathologic Diagnosis 1.  Skin, right chest, shave biopsy:  -MELANOCYTIC NEVUS, INTRADERMAL TYPE  2.  Skin, left axilla, shave biopsy:  -MELANOCYTIC NEVUS, INTRADERMAL TYPE  These lesions are benign.   Comment: Interp By Jaun Tobar M.D., Signed on 07/02/2021 at 12:57   Gross Number of conta     No Phx of NMSC.  No Fhx of melanoma.    Past Medical History:   Diagnosis Date    Anxiety     Hashimoto's disease        Review of Systems   Constitutional:  Negative for fever, chills and fatigue.   Respiratory:  Negative for cough and shortness of breath.    Skin:  Positive for daily sunscreen use and activity-related sunscreen use. Negative for itching, rash, dry skin and dry lips.   Hematologic/Lymphatic: Does not bruise/bleed easily.       Objective:   Physical Exam   Constitutional: She appears well-developed and well-nourished. No distress.   Neurological: She is alert and oriented to person, place, and time. She is not disoriented.   Psychiatric: She has a normal mood and affect.   Skin:   Areas Examined (abnormalities noted in diagram):   Scalp / Hair Palpated and Inspected  Head / Face Inspection Performed  Neck Inspection Performed  Chest / Axilla Inspection Performed  Abdomen Inspection Performed  Genitals / Buttocks / Groin Inspection Performed  Back Inspection Performed  RUE Inspected  LUE Inspection Performed  RLE Inspected  LLE Inspection Performed  Nails and Digits Inspection Performed                 Diagram Legend     Erythematous scaling macule/papule c/w actinic keratosis       Vascular papule c/w angioma      Pigmented verrucoid papule/plaque c/w seborrheic keratosis      Yellow umbilicated papule c/w sebaceous  hyperplasia      Irregularly shaped tan macule c/w lentigo     1-2 mm smooth white papules consistent with Milia      Movable subcutaneous cyst with punctum c/w epidermal inclusion cyst      Subcutaneous movable cyst c/w pilar cyst      Firm pink to brown papule c/w dermatofibroma      Pedunculated fleshy papule(s) c/w skin tag(s)      Evenly pigmented macule c/w junctional nevus     Mildly variegated pigmented, slightly irregular-bordered macule c/w mildly atypical nevus      Flesh colored to evenly pigmented papule c/w intradermal nevus       Pink pearly papule/plaque c/w basal cell carcinoma      Erythematous hyperkeratotic cursted plaque c/w SCC      Surgical scar with no sign of skin cancer recurrence      Open and closed comedones      Inflammatory papules and pustules      Verrucoid papule consistent consistent with wart     Erythematous eczematous patches and plaques     Dystrophic onycholytic nail with subungual debris c/w onychomycosis     Umbilicated papule    Erythematous-base heme-crusted tan verrucoid plaque consistent with inflamed seborrheic keratosis     Erythematous Silvery Scaling Plaque c/w Psoriasis     See annotation      Assessment / Plan:        Notalgia paresthetica  Discussed sensory neuropathic etiology     Multiple benign nevi  Back  Discussed ABCDE's of nevi.  Monitor for new mole or moles that are becoming bigger, darker, irritated, or developing irregular borders.       Sebaceous hyperplasia  Face  Reassurance given to patient. No treatment is necessary.   Treatment of benign, asymptomatic lesions may be considered cosmetic.      Skin cancer screening  Area of previous melanoma examined. Site well healed with no signs of recurrence.    Total body skin examination performed today including at least 12 points as noted in physical examination. No lesions suspicious for malignancy noted.    Seborrheic keratoses, back and right posterior knee   These are benign inherited growths without a  malignant potential. Reassurance given to patient. No treatment is necessary.            No follow-ups on file.

## 2024-04-26 ENCOUNTER — LAB VISIT (OUTPATIENT)
Dept: LAB | Facility: HOSPITAL | Age: 44
End: 2024-04-26
Attending: STUDENT IN AN ORGANIZED HEALTH CARE EDUCATION/TRAINING PROGRAM
Payer: COMMERCIAL

## 2024-04-26 ENCOUNTER — OFFICE VISIT (OUTPATIENT)
Dept: FAMILY MEDICINE | Facility: CLINIC | Age: 44
End: 2024-04-26
Payer: COMMERCIAL

## 2024-04-26 ENCOUNTER — TELEPHONE (OUTPATIENT)
Dept: FAMILY MEDICINE | Facility: CLINIC | Age: 44
End: 2024-04-26
Payer: COMMERCIAL

## 2024-04-26 ENCOUNTER — PATIENT OUTREACH (OUTPATIENT)
Dept: ADMINISTRATIVE | Facility: HOSPITAL | Age: 44
End: 2024-04-26
Payer: COMMERCIAL

## 2024-04-26 VITALS
HEART RATE: 87 BPM | RESPIRATION RATE: 16 BRPM | SYSTOLIC BLOOD PRESSURE: 136 MMHG | DIASTOLIC BLOOD PRESSURE: 90 MMHG | OXYGEN SATURATION: 97 % | WEIGHT: 216.13 LBS | HEIGHT: 62 IN | BODY MASS INDEX: 39.77 KG/M2

## 2024-04-26 DIAGNOSIS — Z97.5 IUD (INTRAUTERINE DEVICE) IN PLACE: ICD-10-CM

## 2024-04-26 DIAGNOSIS — E04.2 MULTINODULAR GOITER: ICD-10-CM

## 2024-04-26 DIAGNOSIS — E66.09 CLASS 2 OBESITY DUE TO EXCESS CALORIES WITHOUT SERIOUS COMORBIDITY WITH BODY MASS INDEX (BMI) OF 39.0 TO 39.9 IN ADULT: ICD-10-CM

## 2024-04-26 DIAGNOSIS — Z13.6 ENCOUNTER FOR LIPID SCREENING FOR CARDIOVASCULAR DISEASE: ICD-10-CM

## 2024-04-26 DIAGNOSIS — E06.3 HASHIMOTO'S THYROIDITIS: ICD-10-CM

## 2024-04-26 DIAGNOSIS — I10 PRIMARY HYPERTENSION: ICD-10-CM

## 2024-04-26 DIAGNOSIS — Z11.59 NEED FOR HEPATITIS C SCREENING TEST: ICD-10-CM

## 2024-04-26 DIAGNOSIS — Z13.220 ENCOUNTER FOR LIPID SCREENING FOR CARDIOVASCULAR DISEASE: ICD-10-CM

## 2024-04-26 DIAGNOSIS — R00.0 RACING HEART BEAT: ICD-10-CM

## 2024-04-26 DIAGNOSIS — Z12.31 ENCOUNTER FOR SCREENING MAMMOGRAM FOR BREAST CANCER: ICD-10-CM

## 2024-04-26 DIAGNOSIS — Z12.11 SCREENING FOR COLORECTAL CANCER: ICD-10-CM

## 2024-04-26 DIAGNOSIS — F33.1 MODERATE EPISODE OF RECURRENT MAJOR DEPRESSIVE DISORDER: ICD-10-CM

## 2024-04-26 DIAGNOSIS — R73.03 PREDIABETES: ICD-10-CM

## 2024-04-26 DIAGNOSIS — Z12.12 SCREENING FOR COLORECTAL CANCER: ICD-10-CM

## 2024-04-26 DIAGNOSIS — F41.9 ANXIETY: Primary | ICD-10-CM

## 2024-04-26 LAB
ALBUMIN SERPL BCP-MCNC: 3.9 G/DL (ref 3.5–5.2)
ALBUMIN/CREAT UR: NORMAL UG/MG (ref 0–30)
ALP SERPL-CCNC: 86 U/L (ref 55–135)
ALT SERPL W/O P-5'-P-CCNC: 33 U/L (ref 10–44)
ANION GAP SERPL CALC-SCNC: 7 MMOL/L (ref 8–16)
AST SERPL-CCNC: 16 U/L (ref 10–40)
BASOPHILS # BLD AUTO: 0.06 K/UL (ref 0–0.2)
BASOPHILS NFR BLD: 0.6 % (ref 0–1.9)
BILIRUB SERPL-MCNC: 0.6 MG/DL (ref 0.1–1)
BUN SERPL-MCNC: 18 MG/DL (ref 6–20)
CALCIUM SERPL-MCNC: 9.8 MG/DL (ref 8.7–10.5)
CHLORIDE SERPL-SCNC: 104 MMOL/L (ref 95–110)
CHOLEST SERPL-MCNC: 199 MG/DL (ref 120–199)
CHOLEST/HDLC SERPL: 3.8 {RATIO} (ref 2–5)
CO2 SERPL-SCNC: 25 MMOL/L (ref 23–29)
CREAT SERPL-MCNC: 0.8 MG/DL (ref 0.5–1.4)
CREAT UR-MCNC: 35 MG/DL (ref 15–325)
DIFFERENTIAL METHOD BLD: ABNORMAL
EOSINOPHIL # BLD AUTO: 0.2 K/UL (ref 0–0.5)
EOSINOPHIL NFR BLD: 1.6 % (ref 0–8)
ERYTHROCYTE [DISTWIDTH] IN BLOOD BY AUTOMATED COUNT: 11.9 % (ref 11.5–14.5)
EST. GFR  (NO RACE VARIABLE): >60 ML/MIN/1.73 M^2
ESTIMATED AVG GLUCOSE: 94 MG/DL (ref 68–131)
GLUCOSE SERPL-MCNC: 90 MG/DL (ref 70–110)
HBA1C MFR BLD: 4.9 % (ref 4–5.6)
HCT VFR BLD AUTO: 42 % (ref 37–48.5)
HCV AB SERPL QL IA: NORMAL
HDLC SERPL-MCNC: 52 MG/DL (ref 40–75)
HDLC SERPL: 26.1 % (ref 20–50)
HGB BLD-MCNC: 14.5 G/DL (ref 12–16)
IMM GRANULOCYTES # BLD AUTO: 0.06 K/UL (ref 0–0.04)
IMM GRANULOCYTES NFR BLD AUTO: 0.6 % (ref 0–0.5)
LDLC SERPL CALC-MCNC: 117.2 MG/DL (ref 63–159)
LYMPHOCYTES # BLD AUTO: 2.3 K/UL (ref 1–4.8)
LYMPHOCYTES NFR BLD: 22.5 % (ref 18–48)
MCH RBC QN AUTO: 31.3 PG (ref 27–31)
MCHC RBC AUTO-ENTMCNC: 34.5 G/DL (ref 32–36)
MCV RBC AUTO: 91 FL (ref 82–98)
MICROALBUMIN UR DL<=1MG/L-MCNC: <5 UG/ML
MONOCYTES # BLD AUTO: 0.6 K/UL (ref 0.3–1)
MONOCYTES NFR BLD: 5.9 % (ref 4–15)
NEUTROPHILS # BLD AUTO: 6.9 K/UL (ref 1.8–7.7)
NEUTROPHILS NFR BLD: 68.8 % (ref 38–73)
NONHDLC SERPL-MCNC: 147 MG/DL
NRBC BLD-RTO: 0 /100 WBC
PLATELET # BLD AUTO: 348 K/UL (ref 150–450)
PMV BLD AUTO: 9 FL (ref 9.2–12.9)
POTASSIUM SERPL-SCNC: 4.7 MMOL/L (ref 3.5–5.1)
PROT SERPL-MCNC: 7.2 G/DL (ref 6–8.4)
RBC # BLD AUTO: 4.63 M/UL (ref 4–5.4)
SODIUM SERPL-SCNC: 136 MMOL/L (ref 136–145)
TRIGL SERPL-MCNC: 149 MG/DL (ref 30–150)
TSH SERPL DL<=0.005 MIU/L-ACNC: 3.92 UIU/ML (ref 0.4–4)
WBC # BLD AUTO: 10.02 K/UL (ref 3.9–12.7)

## 2024-04-26 PROCEDURE — 83036 HEMOGLOBIN GLYCOSYLATED A1C: CPT | Performed by: STUDENT IN AN ORGANIZED HEALTH CARE EDUCATION/TRAINING PROGRAM

## 2024-04-26 PROCEDURE — 3080F DIAST BP >= 90 MM HG: CPT | Mod: S$GLB,,, | Performed by: STUDENT IN AN ORGANIZED HEALTH CARE EDUCATION/TRAINING PROGRAM

## 2024-04-26 PROCEDURE — 99999 PR PBB SHADOW E&M-EST. PATIENT-LVL IV: CPT | Mod: PBBFAC,,, | Performed by: STUDENT IN AN ORGANIZED HEALTH CARE EDUCATION/TRAINING PROGRAM

## 2024-04-26 PROCEDURE — 99204 OFFICE O/P NEW MOD 45 MIN: CPT | Mod: S$GLB,,, | Performed by: STUDENT IN AN ORGANIZED HEALTH CARE EDUCATION/TRAINING PROGRAM

## 2024-04-26 PROCEDURE — 80053 COMPREHEN METABOLIC PANEL: CPT | Performed by: STUDENT IN AN ORGANIZED HEALTH CARE EDUCATION/TRAINING PROGRAM

## 2024-04-26 PROCEDURE — 36415 COLL VENOUS BLD VENIPUNCTURE: CPT | Performed by: STUDENT IN AN ORGANIZED HEALTH CARE EDUCATION/TRAINING PROGRAM

## 2024-04-26 PROCEDURE — 84443 ASSAY THYROID STIM HORMONE: CPT | Performed by: STUDENT IN AN ORGANIZED HEALTH CARE EDUCATION/TRAINING PROGRAM

## 2024-04-26 PROCEDURE — 3008F BODY MASS INDEX DOCD: CPT | Mod: S$GLB,,, | Performed by: STUDENT IN AN ORGANIZED HEALTH CARE EDUCATION/TRAINING PROGRAM

## 2024-04-26 PROCEDURE — 1160F RVW MEDS BY RX/DR IN RCRD: CPT | Mod: S$GLB,,, | Performed by: STUDENT IN AN ORGANIZED HEALTH CARE EDUCATION/TRAINING PROGRAM

## 2024-04-26 PROCEDURE — 1159F MED LIST DOCD IN RCRD: CPT | Mod: S$GLB,,, | Performed by: STUDENT IN AN ORGANIZED HEALTH CARE EDUCATION/TRAINING PROGRAM

## 2024-04-26 PROCEDURE — 82043 UR ALBUMIN QUANTITATIVE: CPT | Performed by: STUDENT IN AN ORGANIZED HEALTH CARE EDUCATION/TRAINING PROGRAM

## 2024-04-26 PROCEDURE — 85025 COMPLETE CBC W/AUTO DIFF WBC: CPT | Performed by: STUDENT IN AN ORGANIZED HEALTH CARE EDUCATION/TRAINING PROGRAM

## 2024-04-26 PROCEDURE — 3075F SYST BP GE 130 - 139MM HG: CPT | Mod: S$GLB,,, | Performed by: STUDENT IN AN ORGANIZED HEALTH CARE EDUCATION/TRAINING PROGRAM

## 2024-04-26 PROCEDURE — 86803 HEPATITIS C AB TEST: CPT | Performed by: STUDENT IN AN ORGANIZED HEALTH CARE EDUCATION/TRAINING PROGRAM

## 2024-04-26 PROCEDURE — 80061 LIPID PANEL: CPT | Performed by: STUDENT IN AN ORGANIZED HEALTH CARE EDUCATION/TRAINING PROGRAM

## 2024-04-26 RX ORDER — BUPROPION HYDROCHLORIDE 150 MG/1
150 TABLET ORAL DAILY
Qty: 90 TABLET | Refills: 3 | Status: SHIPPED | OUTPATIENT
Start: 2024-04-26 | End: 2025-04-26

## 2024-04-26 RX ORDER — AMLODIPINE BESYLATE 5 MG/1
5 TABLET ORAL DAILY
Qty: 90 TABLET | Refills: 3 | Status: SHIPPED | OUTPATIENT
Start: 2024-04-26 | End: 2025-04-26

## 2024-04-26 RX ORDER — PROPRANOLOL HYDROCHLORIDE 10 MG/1
10 TABLET ORAL 3 TIMES DAILY
Qty: 90 TABLET | Refills: 11 | Status: SHIPPED | OUTPATIENT
Start: 2024-04-26 | End: 2025-04-26

## 2024-04-26 RX ORDER — BUSPIRONE HYDROCHLORIDE 5 MG/1
5 TABLET ORAL 3 TIMES DAILY PRN
Qty: 90 TABLET | Refills: 11 | Status: SHIPPED | OUTPATIENT
Start: 2024-04-26 | End: 2025-04-26

## 2024-04-26 NOTE — ASSESSMENT & PLAN NOTE
Wt Readings from Last 6 Encounters:   04/26/24 98 kg (216 lb 1.6 oz)   04/09/24 98 kg (216 lb 0.8 oz)   05/18/21 98 kg (216 lb)   06/25/20 98.2 kg (216 lb 9.6 oz)   05/08/20 98.9 kg (218 lb)   09/16/19 99.6 kg (219 lb 9.6 oz)     Doing well overall.  She is working on diet and exercise goals.  She has regained some of her weight back.- had initially lost 25 lbs

## 2024-04-26 NOTE — PROGRESS NOTES
Subjective:       Patient ID: Margo Griffin is a 44 y.o. female.    Chief Complaint: Establish Care and Hypertension    Overall doing very well  She has no acute complaints  Continued weight gain but overall doing great  She is compliant with meds  Working on diet/exercise changes.         .  Patient Active Problem List   Diagnosis    Anxiety    Hashimoto's thyroiditis    Multinodular goiter    Class 2 obesity without serious comorbidity with body mass index (BMI) of 39.0 to 39.9 in adult    Moderate episode of recurrent major depressive disorder    Prediabetes    Primary hypertension    IUD (intrauterine device) in place     Margo has a current medication list which includes the following prescription(s): amlodipine, bupropion, buspirone, and propranolol.    Review of Systems   Constitutional:  Negative for activity change and appetite change.   Respiratory:  Negative for shortness of breath.    Cardiovascular:  Negative for chest pain.   Gastrointestinal:  Negative for abdominal pain.   Genitourinary:  Negative for dysuria.   Integumentary:  Negative for rash.   Psychiatric/Behavioral:  Negative for dysphoric mood and sleep disturbance. The patient is not nervous/anxious.          Health Maintenance Due   Topic Date Due    Hepatitis C Screening  Never done    TETANUS VACCINE  Never done    Cervical Cancer Screening  10/25/2021    Mammogram  05/18/2022    Colorectal Cancer Screening  Never done    COVID-19 Vaccine (3 - 2023-24 season) 09/01/2023      Health Maintenance reviewed and discussed- JAMES for her mammogram and pap smear results.  Labs ordered. Encouraged vaccines.   Objective:      Physical Exam  Constitutional:       General: She is not in acute distress.     Appearance: Normal appearance. She is obese. She is not ill-appearing.   Eyes:      Conjunctiva/sclera: Conjunctivae normal.   Cardiovascular:      Rate and Rhythm: Normal rate and regular rhythm.      Heart sounds: Normal heart sounds. No murmur  heard.  Pulmonary:      Effort: Pulmonary effort is normal. No respiratory distress.      Breath sounds: Normal breath sounds.   Musculoskeletal:      Right lower leg: No edema.      Left lower leg: No edema.   Skin:     General: Skin is warm and dry.   Neurological:      Mental Status: She is alert. Mental status is at baseline.      Gait: Gait normal.   Psychiatric:         Mood and Affect: Mood normal.         Behavior: Behavior normal.         Thought Content: Thought content normal.         Judgment: Judgment normal.         Assessment:       1. Anxiety    2. Encounter for screening mammogram for breast cancer    3. Screening for colorectal cancer    4. Need for hepatitis C screening test    5. Hashimoto's thyroiditis    6. Multinodular goiter    7. Class 2 obesity due to excess calories without serious comorbidity with body mass index (BMI) of 39.0 to 39.9 in adult    8. Moderate episode of recurrent major depressive disorder    9. Prediabetes    10. Primary hypertension    11. Encounter for lipid screening for cardiovascular disease    12. IUD (intrauterine device) in place    13. Racing heart beat        Plan:       1. Anxiety  Assessment & Plan:  Stable on wellbutrin  Add buspar for panic.  Could try propranolol for the anxiety/racing heart    Orders:  -     buPROPion (WELLBUTRIN XL) 150 MG TB24 tablet; Take 1 tablet (150 mg total) by mouth once daily.  Dispense: 90 tablet; Refill: 3  -     busPIRone (BUSPAR) 5 MG Tab; Take 1 tablet (5 mg total) by mouth 3 (three) times daily as needed (panic attack).  Dispense: 90 tablet; Refill: 11  -     propranoloL (INDERAL) 10 MG tablet; Take 1 tablet (10 mg total) by mouth 3 (three) times daily.  Dispense: 90 tablet; Refill: 11    2. Encounter for screening mammogram for breast cancer  -     Mammo Digital Screening Bilat w/ Felix; Future; Expected date: 04/26/2024    3. Screening for colorectal cancer  -     Fecal Immunochemical Test (iFOBT); Future; Expected date:  04/26/2024    4. Need for hepatitis C screening test  -     Hepatitis C antibody; Future; Expected date: 04/26/2024    5. Hashimoto's thyroiditis  Assessment & Plan:  Lab Results   Component Value Date    HGBA1C 5.0 09/16/2019     She was on levothyroxine and was taken off due to hyperthyroid type symptoms.  She is establishing with Dr. Gross with Endocrinology.  Was seeing Dr. [facility]er at [facility].      6. Multinodular goiter  Assessment & Plan:  Looking for repeat us for her thyroid nodules so it will be there for her next appointment    Orders:  -     US Soft Tissue Head Neck; Future; Expected date: 04/26/2024    7. Class 2 obesity due to excess calories without serious comorbidity with body mass index (BMI) of 39.0 to 39.9 in adult  Assessment & Plan:  Wt Readings from Last 6 Encounters:   04/26/24 98 kg (216 lb 1.6 oz)   04/09/24 98 kg (216 lb 0.8 oz)   05/18/21 98 kg (216 lb)   06/25/20 98.2 kg (216 lb 9.6 oz)   05/08/20 98.9 kg (218 lb)   09/16/19 99.6 kg (219 lb 9.6 oz)     Doing well overall.  She is working on diet and exercise goals.  She has regained some of her weight back.- had initially lost 25 lbs      8. Moderate episode of recurrent major depressive disorder  Assessment & Plan:  Stable on wellbutrin    Orders:  -     buPROPion (WELLBUTRIN XL) 150 MG TB24 tablet; Take 1 tablet (150 mg total) by mouth once daily.  Dispense: 90 tablet; Refill: 3    9. Prediabetes  Assessment & Plan:  Lab Results   Component Value Date    HGBA1C 5.0 09/16/2019     Was on metformin. Currently not on anything after her weight loss and was doing diet control.  She felt poorly with nausea with her metformin.     Orders:  -     Hemoglobin A1c; Future; Expected date: 04/26/2024  -     Microalbumin/Creatinine Ratio, Urine; Future; Expected date: 04/26/2024  -     TSH; Future; Expected date: 04/26/2024    10. Primary hypertension  Assessment & Plan:  Family history of hypertension  Has been increasing  Start meds and then can  wean off if possible.  BP Readings from Last 3 Encounters:   04/26/24 (!) 139/94   06/25/20 118/82   05/08/20 128/82         Orders:  -     CBC Auto Differential; Future; Expected date: 04/26/2024  -     Comprehensive Metabolic Panel; Future; Expected date: 04/26/2024  -     amLODIPine (NORVASC) 5 MG tablet; Take 1 tablet (5 mg total) by mouth once daily.  Dispense: 90 tablet; Refill: 3    11. Encounter for lipid screening for cardiovascular disease  -     Lipid Panel; Future; Expected date: 04/26/2024    12. IUD (intrauterine device) in place  Assessment & Plan:  Follows with obgyn. Placed 2018-6th year with IUD      13. Racing heart beat  -     propranoloL (INDERAL) 10 MG tablet; Take 1 tablet (10 mg total) by mouth 3 (three) times daily.  Dispense: 90 tablet; Refill: 11

## 2024-04-26 NOTE — ASSESSMENT & PLAN NOTE
Lab Results   Component Value Date    HGBA1C 5.0 09/16/2019     Was on metformin. Currently not on anything after her weight loss and was doing diet control.  She felt poorly with nausea with her metformin.

## 2024-04-26 NOTE — LETTER
"       AUTHORIZATION FOR RELEASE OF   CONFIDENTIAL INFORMATION    Dear Dr Martinez,    We are seeing Margo Griffin, date of birth 1980, in the clinic at [facility]. Mattie Sauer MD is the patient's PCP. Margo Griffin has an outstanding lab/procedure at the time we reviewed her chart. In order to help keep her health information updated, she has authorized us to request the following medical record(s):                                                 ( X )  PAP SMEAR                                                  Please fax records to [facility], [place], Christie H., MD, [phone].    Thank you  Gladys Louise IVAN  Clinical Care Coordinator  [facility]           Patient Name: Margo Griffin  : 1980  Patient Phone #: [phone]                  A. Consent for Examination and Treatment: I hereby authorize the providers and employees of [facility] ("[facility] to provide medical treatment/services which includes, but is not limited to, performing and administering tests and diagnostic procedures that are deemed necessary, Including, but not limited to, imaging examinations, blood tests and other laboratory procedures as may be required by the hospital, clinic, or may be ordered by my physician(s) or persons working under the general and/or special instructions of my physician(s).                   1.      I understand and agree that this consent covers all authorized persons, including but not limited to physicians, residents, nurse practitioners, physicians' assistants, specialists, consultants, student nurses, and independently contracted physicians, who are called upon by the physician in charge, to carry out the diagnostic procedures and medical or surgical treatment.  2.      I hereby authorize [facility] to retain or dispose of any specimens or tissue, should there be such remaining from any test or procedure.  3.      I hereby authorize and give consent " for [facility] providers and employees to take photographs, images or videotapes of such diagnostic, surgical or treatment procedures of Patient as may be required by [facility] or as may be ordered by a physician.  I further acknowledge and agree that [facility] may use cameras or other devices for patient monitoring.  4.      I am aware that the practice of medicine is not an exact science, and I acknowledge that no guarantees have been made to me as to the outcome of any tests, procedures or treatment.                   B. Authorization for Release of Information:  I understand that my insurance company and/or their agents may need information necessary to make determinations about payment/reimbursement.  I hereby provide authorization to release to all insurance companies, their successors, assignees, other parties with whom they may have contracted, or others acting on their behalf, that are involved with payment for any hospital and/or clinic charges incurred by the patient, any information that they request and deem necessary for payment/reimbursement, and/or quality review.  I further authorize the release of my health information to physicians or other health care practitioners on staff who are involved in my health care now and in the future, and to other health care providers, entities, or institutions for the purpose of my continued care and treatment, including referrals.     C. Medicare Patient's Certification and Authorization to Release Information and Payment Request:  I certify that the information given by me in applying for payment under Title XVIII of the Social Security Act is correct.  I authorize any june of medical or other information about me to release to the Social Security Administration, or its intermediaries or carriers, any information needed for this or a related Medicare claim.  I request that payment of authorized benefits be made on my behalf.     D. Assignment of Insurance Benefits:  I  hereby authorize any and all insurance companies, health plans, defined benefit plans, health insurers or any entity that is or may be responsible for payment of my medical expenses to pay all hospital and medical benefits now due, and to become due and payable to me under any hospital benefits, sick benefits, injury benefits or any other benefit for services rendered to me, including Major Medical Benefits, direct to [facility] and all independently contracted physicians.  I assign any and all rights that I may have against any and all insurance companies, health plans, defined benefit plans, health insurers or any entity that is or may be responsible for payment of my medical expenses, including, but not limited to any right to appeal a denial of a claim, any right to bring any action, lawsuit, administrative proceeding, or other cause of action on my behalf.  I specifically assign my right to pursue litigation against any and all insurance companies, health plans, defined benefit plans, health insurers or any entity that is or may be responsible for payment of medical expenses based upon a refusal to pay charges.              REGISTRATION  AUTHORIZATION                    E. Valuables:  It is understood and agreed that [facility] is not liable for the damage to or loss of any money, jewelry, documents, dentures, eye glasses, hearing aids, prosthetics, or other property of value.     F. Computer Equipment:  I understand and agree that should I choose to use computer equipment owned by [facility] or if I choose to access the Internet via [facility]'s network, I do so at my own risk.  [facility] is not responsible for any damage to my computer equipment or to any damages of any type that might arise from my loss of equipment or data.                   G. Acceptance of Financial Responsibility:  I agree that in consideration of the services and supplies that have been or will be furnished to the patient,  I am hereby obligated to  pay all charges made for or on the account of the patient according to the standard rates (in effect at the time the services and supplies are delivered) established by [facility], including its Patient Financial Assistance Policy to the extent it is applicable.  I understand that I am responsible for all charges, or portions thereof, not covered by insurance or other sources.  Patient refunds will be distributed only after balances at all [facility] facilities are paid.                   H. Communication Authorization:  I hereby authorize [facility] and its representatives, along with any billing service or  who may work on their behalf, to contact me on my cell phone and/or home phone using prerecorded messages, artificial voice messages, automatic telephone dialing devices or other computer assisted technology, or by electronic mail, text messaging, or by any other form of electronic communication.  This includes, but is not limited to appointment reminders, yearly physical exam reminders, preventive care reminders, patient campaigns, welcome calls, and calls about account balances on my account or any account on which I am listed as a guarantor.  I understand I have the right to opt out of these communications at any time.     I.  Relationship Between Facility and Physician:  I understand that some, but not all, providers furnishing services to the patient are not employees or agents of [facility].  The patient is under the care and supervision of his/her attending physician, and it is the responsibility of the facility and its nursing staff to carry out the instructions of such physicians.  It is the responsibility of the patient's physician/designee to obtain the patient's informed consent, when required, for medical or surgical treatment, special diagnostic or therapeutic procedures, or hospital services rendered for the patient under the special instructions of the physician/designee.     J. Notice of  Private Practices:  I acknowledge I have received a copy of [facility]'s Notice of Privacy Practices.     K. Facility Directory:  I have discussed with the organization my desire to be either included or excluded in the facility directory.  I understand that if my choice is to opt-out of being identified in the facility directory that the facility will not provide any information about me such as my condition (e.g. Fair, stable, etc.) or my location in the facility (e.g. Room number, department).     L. LINKS:  For [place] Residents:  [facility] is a LINKS ([place] Immunization [employer] for [employer] State[employer]) participating facility.  LINKS is a [facility]-sponsored confidential computer system that helps you and your doctor keep track of you and your child's immunization history.  I acknowledge that I am allowing [facility] to share this information with [facility].  For [place] Residents:  [facility] is a MIIX ([place] Immunization Information eXchange) participant.  [employer] is a [place] Department of Health-sponsored confidential computer system that helps you and your doctor keep track of you and your child's immunization history.  I acknowledge that I am allowing [facility] to share information with [employer].     M. Term:  This authorization is valid for this and subsequent care/treatment I receive at [facility] and will remain valid unless/until revoked in writing by me.      ACKNOWLEDGMENT OF POTENTIAL RISKS OF COVID-19 VACCINE  It is important that you, as the patient or patients legally authorized representative(s), understand and acknowledge the following, with regard to administration of the COVID-19 vaccine offered by [facility]:  The SARS-CoV-2 virus (COVID-19) has caused an unprecedented modern global pandemic that has mobilized scientists and drug manufacturers to work to create safe and effective vaccines to get the crisis under control.  No vaccine is released in the [place] without undergoing rigorous,  multi-layered testing and approval by the Food and Drug Administration.  During a public health emergency, however, vaccines can be released for patient administration by the FDA prior to completion of multi-phase clinical trials and approval. This is done by the FDAs granting of Emergency Use Authorization (EUA) when the vaccine meets reasonable thresholds for safety and effectiveness and people are in urgent need of care. Under an EUA, the FDA has found that known and potential benefits outweigh its known and potential risks.  The vaccine for which you are presenting to [facility] has been released under an EUA, which [facility] is honoring in its distribution of the vaccine to the public. While the FDAs authorization indicates its belief that usage is recommended over possible risks, there is still the possibility that unknown risks of the vaccine could exist.  By signing this document, you acknowledge and assume these risks. Further, you waive any and all claims of liability against and hold harmless any [facility] entity or provider for any harm caused to you by said possible unknown risks of the vaccine.        N. [facility]:  As used in this document, [facility] means all [facility] affiliated entities including all health centers, surgery centers, clinics, and hospitals.  It includes more specifically, the following entities: [facility], a not for profit [facility], and its subsidiaries and affiliates, including [facility], [facility], [facility], [facility], [facility]d/b/a [facility], L.L.C.d/b/a [facility] [employer] As manager of [facility], [facility], JOSELYN, [facility] [employer], L.L.C.d/b/a [facility], [facility], [facility] and [facility] as manager of [facility].                                                                Patient/Legal Guardian Signature                                                    This signature was collected at 04/26/2024

## 2024-04-26 NOTE — PATIENT INSTRUCTIONS

## 2024-04-26 NOTE — TELEPHONE ENCOUNTER
[employer] was given to patient on 4/26/2024 8:17 AM    Terrie Christianson MA 04/26/2024 8:17 AM

## 2024-04-26 NOTE — PROGRESS NOTES
Population Health Chart Review & Patient Outreach Details      Additional Pop Health Notes:               Updates Requested / Reviewed:      Updated Care Coordination Note, Care Everywhere, and Care Team Updated         Health Maintenance Topics Overdue:      VBHM Score: 3     Cervical Cancer Screening  Colon Cancer Screening  Uncontrolled BP                       Health Maintenance Topic(s) Outreach Outcomes & Actions Taken:    Cervical Cancer Screening - Outreach Outcomes & Actions Taken  : External Records Uploaded & Care Team Updated if Applicable

## 2024-04-26 NOTE — ASSESSMENT & PLAN NOTE
Family history of hypertension  Has been increasing  Start meds and then can wean off if possible.  BP Readings from Last 3 Encounters:   04/26/24 (!) 139/94   06/25/20 118/82   05/08/20 128/82

## 2024-04-26 NOTE — PROGRESS NOTES
Population Health Chart Review & Patient Outreach Details      Additional [facility] Notes:               Updates Requested / Reviewed:      Updated Care Coordination Note, Care Everywhere, Care Team Updated, and Immunizations Reconciliation Completed or Queried: [facility] Topics Overdue:      [facility] Score: 4     Cervical Cancer Screening  Colon Cancer Screening  Uncontrolled BP  Hemoglobin A1c                       Health Maintenance Topic(s) Outreach Outcomes & Actions Taken:    Cervical Cancer Screening - Outreach Outcomes & Actions Taken  : External Records Requested & Care Team Updated if Applicable    Colorectal Cancer Screening - Outreach Outcomes & Actions Taken  : FitKit was given to patient on 4/26/2024 9:03 AM

## 2024-04-26 NOTE — ASSESSMENT & PLAN NOTE
Lab Results   Component Value Date    HGBA1C 5.0 09/16/2019     She was on levothyroxine and was taken off due to hyperthyroid type symptoms.  She is establishing with Dr. Gross with Endocrinology.  Was seeing Dr. Stringer at [facility].

## 2024-05-29 ENCOUNTER — HOSPITAL ENCOUNTER (OUTPATIENT)
Dept: RADIOLOGY | Facility: HOSPITAL | Age: 44
Discharge: HOME | End: 2024-05-29
Attending: STUDENT IN AN ORGANIZED HEALTH CARE EDUCATION/TRAINING PROGRAM
Payer: COMMERCIAL

## 2024-05-29 DIAGNOSIS — E04.2 MULTINODULAR GOITER: ICD-10-CM

## 2024-05-29 DIAGNOSIS — Z12.31 ENCOUNTER FOR SCREENING MAMMOGRAM FOR BREAST CANCER: ICD-10-CM

## 2024-05-29 PROCEDURE — 77063 BREAST TOMOSYNTHESIS BI: CPT | Mod: 26,,, | Performed by: RADIOLOGY

## 2024-05-29 PROCEDURE — 77063 BREAST TOMOSYNTHESIS BI: CPT | Mod: TC

## 2024-05-29 PROCEDURE — 77067 SCR MAMMO BI INCL CAD: CPT | Mod: 26,,, | Performed by: RADIOLOGY

## 2024-05-29 PROCEDURE — 76536 US EXAM OF HEAD AND NECK: CPT | Mod: 26,,, | Performed by: RADIOLOGY

## 2024-05-29 PROCEDURE — 76536 US EXAM OF HEAD AND NECK: CPT | Mod: TC

## 2024-05-29 PROCEDURE — 77067 SCR MAMMO BI INCL CAD: CPT | Mod: TC

## 2024-06-03 ENCOUNTER — OFFICE VISIT (OUTPATIENT)
Dept: ENDOCRINOLOGY | Facility: CLINIC | Age: 44
End: 2024-06-03
Payer: COMMERCIAL

## 2024-06-03 VITALS
HEART RATE: 98 BPM | SYSTOLIC BLOOD PRESSURE: 108 MMHG | BODY MASS INDEX: 39.95 KG/M2 | WEIGHT: 218.38 LBS | DIASTOLIC BLOOD PRESSURE: 76 MMHG

## 2024-06-03 DIAGNOSIS — E04.2 MULTINODULAR GOITER: ICD-10-CM

## 2024-06-03 DIAGNOSIS — E06.3 HASHIMOTO'S THYROIDITIS: Primary | ICD-10-CM

## 2024-06-03 DIAGNOSIS — E66.09 CLASS 2 OBESITY DUE TO EXCESS CALORIES WITHOUT SERIOUS COMORBIDITY WITH BODY MASS INDEX (BMI) OF 39.0 TO 39.9 IN ADULT: ICD-10-CM

## 2024-06-03 DIAGNOSIS — R73.03 PREDIABETES: ICD-10-CM

## 2024-06-03 DIAGNOSIS — I10 PRIMARY HYPERTENSION: ICD-10-CM

## 2024-06-03 PROCEDURE — 3044F HG A1C LEVEL LT 7.0%: CPT | Mod: CPTII,S$GLB,, | Performed by: HOSPITALIST

## 2024-06-03 PROCEDURE — 3066F NEPHROPATHY DOC TX: CPT | Mod: CPTII,S$GLB,, | Performed by: HOSPITALIST

## 2024-06-03 PROCEDURE — 1160F RVW MEDS BY RX/DR IN RCRD: CPT | Mod: CPTII,S$GLB,, | Performed by: HOSPITALIST

## 2024-06-03 PROCEDURE — 3078F DIAST BP <80 MM HG: CPT | Mod: CPTII,S$GLB,, | Performed by: HOSPITALIST

## 2024-06-03 PROCEDURE — 99999 PR PBB SHADOW E&M-EST. PATIENT-LVL III: CPT | Mod: PBBFAC,,, | Performed by: HOSPITALIST

## 2024-06-03 PROCEDURE — 3061F NEG MICROALBUMINURIA REV: CPT | Mod: CPTII,S$GLB,, | Performed by: HOSPITALIST

## 2024-06-03 PROCEDURE — 3008F BODY MASS INDEX DOCD: CPT | Mod: CPTII,S$GLB,, | Performed by: HOSPITALIST

## 2024-06-03 PROCEDURE — 3074F SYST BP LT 130 MM HG: CPT | Mod: CPTII,S$GLB,, | Performed by: HOSPITALIST

## 2024-06-03 PROCEDURE — 1159F MED LIST DOCD IN RCRD: CPT | Mod: CPTII,S$GLB,, | Performed by: HOSPITALIST

## 2024-06-03 PROCEDURE — 99205 OFFICE O/P NEW HI 60 MIN: CPT | Mod: S$GLB,,, | Performed by: HOSPITALIST

## 2024-06-03 NOTE — PROGRESS NOTES
Subjective:      Patient ID: Margo Griffin is a 44 y.o. female presented to [facility] on 6/3/2024.  Chief complaint:  Thyroid Nodule      History of Present illness: Margo Griffin is a 44 y.o. female here for  multiple thyroid nodules, history of hyperthyroidism  Other significant past medical history:  Obesity    Interval history: Previously seen by Dr. Hernandez endocrinologist: 2020.  Lost follow-up.  Patient reports that she was on semaglutide for weight loss clinic.  With 20 lb weight loss.  Which led her to stop her levothyroxine.  Has been off levothyroxine for the last 2 years.  Has been feeling well.  Fortunately insurance no longer cover semaglutide or Mounjaro, last use 04/2024.  Since that time patient reports weight gain.  She is also reporting hot flashes, anxiety  Palpitation and increase in body odor.  She is requesting further evaluation of hormones.    Patient reports multiple thyroid nodules, Hashimoto's thyroiditis no previous FNA.  Denies compressive symptoms.        1) Hashimoto Thyroiditis  - Diagnosed  in: around 10 years, was on LT4 low dose 25 mcg daily>> stop it in 2022 after weight weight loss  - positive TPO antibody 2019.  Hashimoto's thyroiditis   - She was Semaglutide, Mounjaro ( last dose was 4/2024, was on it for 2 years). Weight gain since off medication 20 lbs  - Patient previously seen by Dr. O'[facility] 06/2020.  - Family history thyroid disorder: yes, father  - Hx of Thyroid goiter: no  - Tobacco user: no  - Previous medication generic levothyroxine 112 mcg daily has not been on medication.    - Most recent TSH 3.9 with a free T4 1.18    Current symptoms:   No   Yes  []    [x]   Weight gain  []    [x]   Fatigue  [x]    []   Constipation  []    [x]   Hair loss  []    [x]   Brittle nails  []    [x]   Mental fog  []    [x]   Cold intolerance  [x]    []   Recent severe illness  [x]    []   Neck swelling, pain, pressure  []    []    Hoarseness  []    [x]   Lower extremity edema  []    []   Skin discoloration/vitiligo    Medications:  [x]    []   Lithium Use  [x]    []   Amiodarone use  [x]    []   Chemotherapy   [x]    []   Radiation Treatment  []    []   Estrogen therapy>> IUD (Mirena, no cycle)  [x]    []   Supplements:  That includes: Kelp, Iodine, Biotin, Selenium, Sea martinez, Bladder wrack    Thyroid lab work  Lab Results   Component Value Date    TSH 3.922 04/26/2024    TSH 1.112 09/30/2020    TSH 0.382 05/12/2020    FREET4 1.18 09/30/2020    FREET4 1.22 05/12/2020    FREET4 0.80 09/16/2019      Antibodies  Lab Results   Component Value Date    THYROPEROXID 209.9 (H) 09/16/2019       2) Multiple thyroid nodules  - noted to have multiple thyroid nodules being monitor by Dr. Hernandez 2020.    - denies compressive symptoms.  - ultrasound 2024 review, 1.3 cm isthmus nodule.    US thyroid  05/29/2024  Thyroid lobes are normal in size demonstrating a mild heterogeneous pattern echogenicity measuring 3.8 x 1.8 x 1.8 cm on the right and 4.9 x 1.6 x 2.0 cm on left.  This measures to a volume of 14.0 cc.  Isthmus measures 0.30 cm.  Thyroid lobes demonstrate normal blood flow by color Doppler.     There are bilateral cystic and solid nodules.  Dominant nodule on the right measures 9 x 7 x 8 mm.  Dominant nodule on the left measures 12 x 6 x 8 mm.  There is a chronic solid hypoechoic nodule of the isthmus measuring 13 x 4 x 8 mm.  This is a TI-RADS 4 nodule and meets criteria for ultrasound follow-up.     No cervical lymphadenopathy identified.     Impression:  1. Chronic multinodular thyroid.  2. TI-RADS 4 nodule of the isthmus which meets criteria for ultrasound follow-up.    06/21/2021  Thyroid lobes are normal in size demonstrating a mild heterogeneous pattern of echogenicity measuring 4.3 x 1.6 x 1.4 cm on the right and 5.0 x 1.4 x 1.3 cm on left.  This measures to a volume of 5.3 cc on the right and 5.0 cc on left with a total volume of 10.3 cc.   The isthmus measures 0.48 cm.  The thyroid lobes demonstrate normal blood flow by color Doppler.     There are small bilateral mixed cystic and solid thyroid nodules.  These do not meet criteria for FNA.  Within the isthmus there is a small hypoechoic solid-appearing nodule which measures 1.2 x 0.5 x 1.2 cm.  This is TIRADS 4 nodule and meets criteria for ultrasound follow-up.  Small benign-appearing cervical lymph nodes are identified.     Impression:  1. Multinodular thyroid.  2. TIRADS 4 nodule of the isthmus which meets criteria for ultrasound follow-up.    05/12/2020   Thyroid lobes are normal in size demonstrating a mild heterogeneous pattern of echogenicity measuring 3.5 x 1.5 x 1.4 cm on the right and 4.7 x 1.3 x 1.2 cm on left.  The isthmus measures 0.34 cm.  Both thyroid lobes demonstrate normal blood flow by color Doppler.     There are small bilateral poorly defined solid-appearing heterogeneous nodules measuring 0.73 x 0.76 x 0.5 cm on the right and 0.94 x 0.56 x 0.55 cm on the left.  There is a poorly defined solid-appearing heterogeneous nodule of the isthmus which measures 1.3 x 0.47 x 0.98 cm.     No significant cervical lymphadenopathy.     Impression:  1. Mild heterogeneity of the thyroid parenchyma in this patient with reported history of Hashimoto's thyroiditis.  2. Bilateral poorly defined solid-appearing heterogeneous thyroid nodules.  Further evaluation with nuclear medicine thyroid scan as deemed clinically necessary.     3) Here for evaluation of hormonal workup  - see compliance has above, requesting evaluation of various hormones.    History includes:  No   Yes  []    [x]  Hypertension  []    []  Hx of hypertensive emergency  [x]    []  Diabetes/Prediabetes  []    [x]  Obesity   []    [x]  Thyroid disorder  [x]    []  Pituitary disorder   []    [x]  Menstrual cycle irregularity/Ovarian issue>> on IUD  [x]    []  Hx Adrenal nodule/Adrenal mass  [x]    []  Hx of Malignancy/cancer  "diagnosis  [x]    []  Steroid use/exposure (PO/IM)  []    []  Opioid/Rec drug use    Lab Results   Component Value Date    HGBA1C 4.9 04/26/2024     No results found for: "PTH", "[id number]"   No results found for: "METANEPHRINE", "NORMETANEPHR", "ALDOSTERONE", "LABRENI", "LABCORT", "ACTH", "DHEASO4"   No results found for: "PROLACTIN", "FSH", "LABLH", "ESTRADIOL", "TOTALTESTOST", "DHEA", "DHEASO4", "ACTH", "LABCORT"   Lab Results   Component Value Date    TSH 3.922 04/26/2024    TSH 1.112 09/30/2020    FREET4 1.18 09/30/2020    FREET4 1.22 05/12/2020    THYROPEROXID 209.9 (H) 09/16/2019                                                                The patient's medications, allergies, past medical, surgical, social and family histories were reviewed and updated as appropriate.  Review of Systems: pertinent positives as per the above HPI, and otherwise negative.    Objective:   /76   Pulse 98   Wt 99.1 kg (218 lb 6.4 oz)   BMI 39.95 kg/m²   Body mass index is 39.95 kg/m².  Vital signs reviewed    Physical Exam  Vitals and nursing note reviewed.   Constitutional:       Appearance: Normal appearance. She is well-developed. She is obese. She is not ill-appearing.   Neck:      Thyroid: No thyromegaly.   Pulmonary:      Effort: Pulmonary effort is normal. No respiratory distress.   Musculoskeletal:         General: Normal range of motion.      Cervical back: Normal range of motion.   Neurological:      General: No focal deficit present.      Mental Status: She is alert. Mental status is at baseline.   Psychiatric:         Mood and Affect: Mood normal.         Behavior: Behavior normal.       Labs reviewed:  See results in subjective  Lab Results   Component Value Date    HGBA1C 4.9 04/26/2024     Lab Results   Component Value Date    CHOL 199 04/26/2024    HDL 52 04/26/2024    LDLCALC 117.2 04/26/2024    TRIG 149 04/26/2024    CHOLHDL 26.1 04/26/2024     Lab Results   Component Value Date     04/26/2024 "    K 4.7 04/26/2024     04/26/2024    CO2 25 04/26/2024    GLU 90 04/26/2024    BUN 18 04/26/2024    CREATININE 0.8 04/26/2024    CALCIUM 9.8 04/26/2024    PROT 7.2 04/26/2024    ALBUMIN 3.9 04/26/2024    BILITOT 0.6 04/26/2024    ALKPHOS 86 04/26/2024    AST 16 04/26/2024    ALT 33 04/26/2024    ANIONGAP 7 (L) 04/26/2024    EGFRNORACEVR >60.0 04/26/2024    TSH 3.922 04/26/2024     Assessment     1. Hashimoto's thyroiditis  TSH    T4, Free    Thyroid Peroxidase Antibody    Estradiol      2. Multinodular goiter  US Soft Tissue Head Neck      3. Class 2 obesity due to excess calories without serious comorbidity with body mass index (BMI) of 39.0 to 39.9 in adult  Comprehensive Metabolic Panel    Estradiol    Testosterone, Free    Follicle Stimulating Hormone    Vitamin D    Luteinizing Hormone    Metanephrines, Plasma Free    Cortisol, Saliva    Cortisol, Saliva      4. Prediabetes        5. Primary hypertension           Plan     No problem-specific Assessment & Plan notes found for this encounter.    Hashimoto's thyroiditis  - Chart review show history of Hashimoto's thyroiditis.  In 2022/2024 on GLP1.  Leading for her to stop levothyroxine  - Currently reporting symptoms of hyperthyroidism due to weight gain while off GLP1   - Most recent TSH review with patient 6/3/2024  - Thyroid for ultrasound review with patient 2024>> stable  - Repeat TFTs soon, pending results may need to restart levothyroxine. IF TSH >4.0, most likely will benefit from levothyroxine  - If restart levothyroxine will follow up with endocrine every 6 months for medication adjustments       Multinodular goiter  - Patient with multiple small thyroid nodule, noted on ultrasound 2024, 2021  - Images for review with patient, all questions answered  - Reassuring  - Consider repeat thyroid ultrasound in 6 months for monitoring of size 12/2024.  May consider FNA of isthmus nodule if increase to 1.5 cm  - Can follow up with PCP for yearly thyroid  "ultrasound        Class 2 obesity due to excess calories without serious comorbidity with body mass index (BMI) of 39.0 to 39.9 in adult  - Body mass index is 39.95 kg/m².  - Patient is here for evaluation and workup of weight gain and hormonal issue  - Low suspicion of actual hormonal causes to her obesity.  Suspect this is due to cessation of GLP1.  - Advised patient restart GLP1 if weight loss as desired.  Unfortunately Insurance does not cover.  - At [facility], do not administer "compounded semaglutide" or "compounded tirzepatide".   - Will pursue hormonal cause of obesity, fatigue  - If hormonal cause with are negative.  Advised patient she may need to discuss with her GYN to see if  HRT is indicated      Hypertension  - advise continue medication per PCP   - screening hormonal cause as above.  Given patient reports palpitation      Prediabetes  - unfortunately insurance does not cover GLP1 for prediabetes   - follow up with PCP pregnancy.      Advised patient to follow up with PCP for routine health maintenance care.   RTC in Most likely follow up with me in 6 months, to monitor thyroid.    Total Time for E/M Service preformed was greater than 60 minutes: Including review of medical records, direct face-to-face time with patient with discussion multiple separate endocrine issues. Along with active medical decision-making in office today.     Darrell Gross M.D.  Endocrinology  [facility]  6/3/2024      Disclaimer: This note has been generated in part with the use of voice-recognition software. There may be typographical errors that have been missed during proof-reading.    "

## 2024-06-21 ENCOUNTER — LAB VISIT (OUTPATIENT)
Dept: LAB | Facility: HOSPITAL | Age: 44
End: 2024-06-21
Attending: ANESTHESIOLOGY
Payer: COMMERCIAL

## 2024-06-21 DIAGNOSIS — E66.09 CLASS 2 OBESITY DUE TO EXCESS CALORIES WITHOUT SERIOUS COMORBIDITY WITH BODY MASS INDEX (BMI) OF 39.0 TO 39.9 IN ADULT: ICD-10-CM

## 2024-06-21 DIAGNOSIS — E06.3 HASHIMOTO'S THYROIDITIS: ICD-10-CM

## 2024-06-21 LAB
25(OH)D3+25(OH)D2 SERPL-MCNC: 19 NG/ML (ref 30–96)
ALBUMIN SERPL BCP-MCNC: 3.6 G/DL (ref 3.5–5.2)
ALP SERPL-CCNC: 75 U/L (ref 55–135)
ALT SERPL W/O P-5'-P-CCNC: 46 U/L (ref 10–44)
ANION GAP SERPL CALC-SCNC: 11 MMOL/L (ref 8–16)
AST SERPL-CCNC: 25 U/L (ref 10–40)
BILIRUB SERPL-MCNC: 0.6 MG/DL (ref 0.1–1)
BUN SERPL-MCNC: 10 MG/DL (ref 6–20)
CALCIUM SERPL-MCNC: 9.1 MG/DL (ref 8.7–10.5)
CHLORIDE SERPL-SCNC: 107 MMOL/L (ref 95–110)
CO2 SERPL-SCNC: 21 MMOL/L (ref 23–29)
CREAT SERPL-MCNC: 0.8 MG/DL (ref 0.5–1.4)
EST. GFR  (NO RACE VARIABLE): >60 ML/MIN/1.73 M^2
ESTRADIOL SERPL-MCNC: 42 PG/ML
FSH SERPL-ACNC: 10.47 MIU/ML
GLUCOSE SERPL-MCNC: 89 MG/DL (ref 70–110)
LH SERPL-ACNC: 2.8 MIU/ML
POTASSIUM SERPL-SCNC: 4.1 MMOL/L (ref 3.5–5.1)
PROT SERPL-MCNC: 7 G/DL (ref 6–8.4)
SODIUM SERPL-SCNC: 139 MMOL/L (ref 136–145)
T4 FREE SERPL-MCNC: 0.86 NG/DL (ref 0.71–1.51)
THYROPEROXIDASE IGG SERPL-ACNC: 191.7 IU/ML
TSH SERPL DL<=0.005 MIU/L-ACNC: 6.53 UIU/ML (ref 0.4–4)

## 2024-06-21 PROCEDURE — 86376 MICROSOMAL ANTIBODY EACH: CPT | Performed by: HOSPITALIST

## 2024-06-21 PROCEDURE — 82533 TOTAL CORTISOL: CPT | Mod: 91 | Performed by: HOSPITALIST

## 2024-06-21 PROCEDURE — 84439 ASSAY OF FREE THYROXINE: CPT | Performed by: HOSPITALIST

## 2024-06-21 PROCEDURE — 36415 COLL VENOUS BLD VENIPUNCTURE: CPT | Performed by: HOSPITALIST

## 2024-06-21 PROCEDURE — 80053 COMPREHEN METABOLIC PANEL: CPT | Performed by: HOSPITALIST

## 2024-06-21 PROCEDURE — 83835 ASSAY OF METANEPHRINES: CPT | Performed by: HOSPITALIST

## 2024-06-21 PROCEDURE — 84443 ASSAY THYROID STIM HORMONE: CPT | Performed by: HOSPITALIST

## 2024-06-21 PROCEDURE — 82306 VITAMIN D 25 HYDROXY: CPT | Performed by: HOSPITALIST

## 2024-06-21 PROCEDURE — 83002 ASSAY OF GONADOTROPIN (LH): CPT | Performed by: HOSPITALIST

## 2024-06-21 PROCEDURE — 83001 ASSAY OF GONADOTROPIN (FSH): CPT | Performed by: HOSPITALIST

## 2024-06-21 PROCEDURE — 82670 ASSAY OF TOTAL ESTRADIOL: CPT | Performed by: HOSPITALIST

## 2024-06-21 PROCEDURE — 84402 ASSAY OF FREE TESTOSTERONE: CPT | Performed by: HOSPITALIST

## 2024-06-24 LAB — TESTOST FREE SERPL-MCNC: 0.5 PG/ML

## 2024-06-25 LAB
CORTIS 12 AM SAL-MCNC: 164 NG/DL
CORTIS 8 AM SAL-MCNC: ABNORMAL NG/ML
CORTIS 8 PM SAL-MCNC: ABNORMAL NG/ML
CORTIS SAL-MCNC: ABNORMAL UG/DL

## 2024-06-26 LAB
CORTIS 12 AM SAL-MCNC: 214 NG/DL
CORTIS 8 AM SAL-MCNC: ABNORMAL NG/ML
CORTIS 8 PM SAL-MCNC: ABNORMAL NG/ML
CORTIS SAL-MCNC: ABNORMAL UG/DL

## 2024-06-27 LAB
METANEPH FREE SERPL-MCNC: 31 PG/ML
METANEPHS SERPL-MCNC: 65 PG/ML
NORMETANEPH FREE SERPL-MCNC: 34 PG/ML

## 2024-07-09 ENCOUNTER — PATIENT MESSAGE (OUTPATIENT)
Dept: ENDOCRINOLOGY | Facility: CLINIC | Age: 44
End: 2024-07-09
Payer: COMMERCIAL

## 2024-07-09 DIAGNOSIS — R79.89 ELEVATED CORTISOL LEVEL: Primary | ICD-10-CM

## 2024-07-09 DIAGNOSIS — E03.9 HYPOTHYROIDISM, UNSPECIFIED TYPE: ICD-10-CM

## 2024-07-09 DIAGNOSIS — E06.3 HASHIMOTO'S THYROIDITIS: ICD-10-CM

## 2024-07-10 RX ORDER — LEVOTHYROXINE SODIUM 88 UG/1
88 TABLET ORAL
Qty: 30 TABLET | Refills: 11 | Status: SHIPPED | OUTPATIENT
Start: 2024-07-10 | End: 2025-07-10

## 2024-07-15 ENCOUNTER — LAB VISIT (OUTPATIENT)
Dept: LAB | Facility: HOSPITAL | Age: 44
End: 2024-07-15
Attending: HOSPITALIST
Payer: COMMERCIAL

## 2024-07-15 DIAGNOSIS — R79.89 ELEVATED CORTISOL LEVEL: ICD-10-CM

## 2024-07-15 LAB
CREAT 24H UR-MRATE: 60 MG/HR (ref 40–75)
CREAT UR-MCNC: 151.6 MG/DL (ref 15–325)
CREATININE, URINE (MG/SPEC): 1440.2 MG/SPEC
URINE COLLECTION DURATION: 24 HR
URINE VOLUME: 950 ML

## 2024-07-15 PROCEDURE — 83835 ASSAY OF METANEPHRINES: CPT | Performed by: HOSPITALIST

## 2024-07-15 PROCEDURE — 82530 CORTISOL FREE: CPT | Performed by: HOSPITALIST

## 2024-07-15 PROCEDURE — 82570 ASSAY OF URINE CREATININE: CPT | Performed by: HOSPITALIST

## 2024-07-18 LAB
ANNOTATION COMMENT IMP: NORMAL
COLLECT DURATION TIME UR: 24 H
METANEPH 24H UR-MRATE: 128 MCG/24 H
METANEPHS 24H UR-MRATE: 364 MCG/24 H
NORMETANEPHRINE 24H UR-MRATE: 236 MCG/24 H
SPECIMEN VOL 24H UR: 950 ML

## 2024-07-19 LAB
COLLECT DURATION TIME UR: 24 H
CORTIS 24H UR-MRATE: 15 MCG/24 H (ref 3.5–45)
SPECIMEN VOL ?TM UR: 950 ML

## 2024-08-30 ENCOUNTER — PATIENT MESSAGE (OUTPATIENT)
Dept: ENDOCRINOLOGY | Facility: CLINIC | Age: 44
End: 2024-08-30
Payer: COMMERCIAL

## 2024-10-04 ENCOUNTER — TELEPHONE (OUTPATIENT)
Dept: DERMATOLOGY | Facility: CLINIC | Age: 44
End: 2024-10-04
Payer: COMMERCIAL

## 2025-03-31 DIAGNOSIS — I10 PRIMARY HYPERTENSION: ICD-10-CM

## 2025-03-31 RX ORDER — AMLODIPINE BESYLATE 5 MG/1
5 TABLET ORAL
Qty: 90 TABLET | Refills: 0 | Status: SHIPPED | OUTPATIENT
Start: 2025-03-31

## 2025-03-31 NOTE — TELEPHONE ENCOUNTER
Refill Decision Note   Margo Wankinza  is requesting a refill authorization.  Brief Assessment and Rationale for Refill:  Approve     Medication Therapy Plan:       Medication Reconciliation Completed: No   Comments:     No Care Gaps recommended.     Note composed:11:50 AM 03/31/2025

## 2025-03-31 NOTE — TELEPHONE ENCOUNTER
No care due was identified.  [facility] Embedded Care Due Messages. Reference number: [id number].   3/31/2025 12:14:03 AM CDT

## 2025-04-25 ENCOUNTER — OFFICE VISIT (OUTPATIENT)
Dept: FAMILY MEDICINE | Facility: CLINIC | Age: 45
End: 2025-04-25
Payer: COMMERCIAL

## 2025-04-25 ENCOUNTER — LAB VISIT (OUTPATIENT)
Dept: LAB | Facility: HOSPITAL | Age: 45
End: 2025-04-25
Attending: STUDENT IN AN ORGANIZED HEALTH CARE EDUCATION/TRAINING PROGRAM
Payer: COMMERCIAL

## 2025-04-25 ENCOUNTER — TELEPHONE (OUTPATIENT)
Dept: FAMILY MEDICINE | Facility: CLINIC | Age: 45
End: 2025-04-25

## 2025-04-25 VITALS
RESPIRATION RATE: 16 BRPM | SYSTOLIC BLOOD PRESSURE: 122 MMHG | DIASTOLIC BLOOD PRESSURE: 86 MMHG | WEIGHT: 227.31 LBS | HEIGHT: 62 IN | OXYGEN SATURATION: 97 % | HEART RATE: 96 BPM | BODY MASS INDEX: 41.83 KG/M2

## 2025-04-25 DIAGNOSIS — E06.3 HASHIMOTO'S THYROIDITIS: ICD-10-CM

## 2025-04-25 DIAGNOSIS — R73.03 PREDIABETES: ICD-10-CM

## 2025-04-25 DIAGNOSIS — F41.9 ANXIETY: ICD-10-CM

## 2025-04-25 DIAGNOSIS — R20.2 PARESTHESIA: ICD-10-CM

## 2025-04-25 DIAGNOSIS — F33.1 MODERATE EPISODE OF RECURRENT MAJOR DEPRESSIVE DISORDER: ICD-10-CM

## 2025-04-25 DIAGNOSIS — Z12.39 ENCOUNTER FOR SCREENING FOR MALIGNANT NEOPLASM OF BREAST, UNSPECIFIED SCREENING MODALITY: ICD-10-CM

## 2025-04-25 DIAGNOSIS — I10 PRIMARY HYPERTENSION: ICD-10-CM

## 2025-04-25 DIAGNOSIS — R60.0 LOCALIZED EDEMA: Primary | ICD-10-CM

## 2025-04-25 DIAGNOSIS — Z82.49 FAMILY HISTORY OF HEART FAILURE: ICD-10-CM

## 2025-04-25 DIAGNOSIS — E55.9 VITAMIN D DEFICIENCY: ICD-10-CM

## 2025-04-25 DIAGNOSIS — E04.2 MULTINODULAR GOITER: ICD-10-CM

## 2025-04-25 DIAGNOSIS — Z12.11 SCREENING FOR COLON CANCER: ICD-10-CM

## 2025-04-25 LAB
25(OH)D3+25(OH)D2 SERPL-MCNC: 33 NG/ML (ref 30–96)
ALBUMIN SERPL BCP-MCNC: 4 G/DL (ref 3.5–5.2)
ALBUMIN/CREAT UR: 13.8 UG/MG
ALP SERPL-CCNC: 87 UNIT/L (ref 40–150)
ALT SERPL W/O P-5'-P-CCNC: 94 UNIT/L (ref 10–44)
ANION GAP (OHS): 10 MMOL/L (ref 8–16)
AST SERPL-CCNC: 59 UNIT/L (ref 11–45)
BILIRUB SERPL-MCNC: 0.6 MG/DL (ref 0.1–1)
BUN SERPL-MCNC: 13 MG/DL (ref 6–20)
CALCIUM SERPL-MCNC: 9.4 MG/DL (ref 8.7–10.5)
CHLORIDE SERPL-SCNC: 105 MMOL/L (ref 95–110)
CO2 SERPL-SCNC: 23 MMOL/L (ref 23–29)
CREAT SERPL-MCNC: 0.8 MG/DL (ref 0.5–1.4)
CREAT UR-MCNC: 130 MG/DL (ref 15–325)
EAG (OHS): 100 MG/DL (ref 68–131)
ERYTHROCYTE [DISTWIDTH] IN BLOOD BY AUTOMATED COUNT: 12.2 % (ref 11.5–14.5)
GFR SERPLBLD CREATININE-BSD FMLA CKD-EPI: >60 ML/MIN/1.73/M2
GLUCOSE SERPL-MCNC: 99 MG/DL (ref 70–110)
HBA1C MFR BLD: 5.1 % (ref 4–5.6)
HCT VFR BLD AUTO: 43 % (ref 37–48.5)
HGB BLD-MCNC: 14.7 GM/DL (ref 12–16)
MCH RBC QN AUTO: 30.8 PG (ref 27–31)
MCHC RBC AUTO-ENTMCNC: 34.2 G/DL (ref 32–36)
MCV RBC AUTO: 90 FL (ref 82–98)
MICROALBUMIN UR-MCNC: 18 UG/ML (ref ?–5000)
PLATELET # BLD AUTO: 332 K/UL (ref 150–450)
PMV BLD AUTO: 9.1 FL (ref 9.2–12.9)
POTASSIUM SERPL-SCNC: 4.3 MMOL/L (ref 3.5–5.1)
PROT SERPL-MCNC: 7.6 GM/DL (ref 6–8.4)
RBC # BLD AUTO: 4.78 M/UL (ref 4–5.4)
SODIUM SERPL-SCNC: 138 MMOL/L (ref 136–145)
T4 FREE SERPL-MCNC: 1.01 NG/DL (ref 0.71–1.51)
TSH SERPL-ACNC: 5.25 UIU/ML (ref 0.4–4)
VIT B12 SERPL-MCNC: 645 PG/ML (ref 210–950)
WBC # BLD AUTO: 9.13 K/UL (ref 3.9–12.7)

## 2025-04-25 PROCEDURE — 85027 COMPLETE CBC AUTOMATED: CPT

## 2025-04-25 PROCEDURE — 84443 ASSAY THYROID STIM HORMONE: CPT

## 2025-04-25 PROCEDURE — 99999 PR PBB SHADOW E&M-EST. PATIENT-LVL III: CPT | Mod: PBBFAC,,, | Performed by: STUDENT IN AN ORGANIZED HEALTH CARE EDUCATION/TRAINING PROGRAM

## 2025-04-25 PROCEDURE — 82570 ASSAY OF URINE CREATININE: CPT

## 2025-04-25 PROCEDURE — 83036 HEMOGLOBIN GLYCOSYLATED A1C: CPT

## 2025-04-25 PROCEDURE — 82607 VITAMIN B-12: CPT

## 2025-04-25 PROCEDURE — 82306 VITAMIN D 25 HYDROXY: CPT

## 2025-04-25 PROCEDURE — 80053 COMPREHEN METABOLIC PANEL: CPT

## 2025-04-25 PROCEDURE — 36415 COLL VENOUS BLD VENIPUNCTURE: CPT

## 2025-04-25 NOTE — ASSESSMENT & PLAN NOTE
Stable. Continue current medications and regular followup.  Hypertension Medications              amLODIPine (NORVASC) 5 MG tablet TAKE 1 TABLET BY MOUTH EVERY DAY    propranoloL (INDERAL) 10 MG tablet Take 1 tablet (10 mg total) by mouth 3 (three) times daily.

## 2025-04-25 NOTE — ASSESSMENT & PLAN NOTE
Lab Results   Component Value Date    HGBA1C 4.9 04/26/2024     Was on metformin. Currently not on anything after her weight loss and was doing diet control.  She felt poorly with nausea with her metformin.   Repeat and follow  Seeing endocrinology

## 2025-04-25 NOTE — PROGRESS NOTES
Subjective:       Patient ID: Margo Griffin is a 45 y.o. female.    Chief Complaint: Leg Swelling    History of Present Illness    CHIEF COMPLAINT:  Ms. Griffin presents today for leg swelling and numbness    HISTORY OF PRESENT ILLNESS:  She reports fluid retention in legs for the past few weeks. She attempted self-treatment with increased water intake and OTC diuretics without improvement. She uses compression socks with some improvement during periods of reduced activity. She continues to experience symptoms in thighs and calves, though notes improvement in ankles and feet.    MEDICAL HISTORY:  She has a history of pre-diabetes with previously good lab results and low vitamin D.    FAMILY HISTORY:  Her father had circulatory issues and experienced congestive heart failure with initial manifestation of leg swelling.    MEDICATIONS:  She takes ashwagandha as replacement for Wellbutrin. Current supplements include milk thistle for liver support, [place] 82, and magnesium. All supplements are taken in the morning except magnesium.    VITAL SIGNS:  She monitors blood pressure at home with blood pressure cuff.       Review of Systems   Constitutional:  Negative for activity change and appetite change.   Respiratory:  Negative for shortness of breath.    Cardiovascular:  Positive for leg swelling. Negative for chest pain.   Gastrointestinal:  Negative for abdominal pain.   Genitourinary:  Negative for dysuria.   Integumentary:  Negative for rash.   Neurological:  Positive for numbness.   Psychiatric/Behavioral:  Negative for depressed mood and sleep disturbance. The patient is not nervous/anxious.       Problem List[1]  Margo has a current medication list which includes the following prescription(s): amlodipine, buspirone, levothyroxine, and propranolol.        Health Maintenance Due   Topic Date Due    Colorectal Cancer Screening  Never done    Hemoglobin A1c (Prediabetes)  04/26/2025    Mammogram  05/29/2025       Health Maintenance reviewed and discussed- mammogram ordered, labs ordered, fit given.     Objective:      Physical Exam  Constitutional:       General: She is not in acute distress.     Appearance: Normal appearance. She is not ill-appearing.   Eyes:      Conjunctiva/sclera: Conjunctivae normal.   Cardiovascular:      Rate and Rhythm: Normal rate and regular rhythm.      Heart sounds: Normal heart sounds. No murmur heard.  Pulmonary:      Effort: Pulmonary effort is normal. No respiratory distress.      Breath sounds: Normal breath sounds. No wheezing or rales.   Musculoskeletal:      Right lower leg: Edema (trace) present.      Left lower leg: Edema (trace) present.   Lymphadenopathy:      Cervical: No cervical adenopathy.   Skin:     General: Skin is warm and dry.   Neurological:      Mental Status: She is alert. Mental status is at baseline.      Gait: Gait normal.   Psychiatric:         Mood and Affect: Mood normal.         Behavior: Behavior normal.         Thought Content: Thought content normal.         Judgment: Judgment normal.             Assessment:       Assessment & Plan    1. Considered multiple potential causes for leg swelling and numbness: circulatory issues, prediabetes complications, and possible varicose veins.  2. Evaluated thyroid function as potential contributor to symptoms.  3. Assessed vitamin D and B12 levels, as previous labs showed low vitamin D.  4. Investigating prediabetes, though recent checks have been favorable.  5. Considered heart failure as less likely but could rule out given family history  6. Suspecting varicose veins or venous insufficiency as most probable cause, given symptom improvement with reduced activity.  7. Noting neuropathy is uncommon in prediabetes, making it less likely as cause of numbness.           Plan:       1. Localized edema  -     CV US Lower Extremity Veins Bilateral Insufficiency; Future  -     Echo; Future    2. Family history of heart  failure    3. Hashimoto's thyroiditis  Assessment & Plan:  Lab Results   Component Value Date    TSH 6.533 (H) 06/21/2024     Recheck and follow    Orders:  -     TSH; Future; Expected date: 04/25/2025    4. Multinodular goiter  Overview:  Us 2024   Thyroid lobes are normal in size demonstrating a mild heterogeneous pattern echogenicity measuring 3.8 x 1.8 x 1.8 cm on the right and 4.9 x 1.6 x 2.0 cm on left.  This measures to a volume of 14.0 cc.  Isthmus measures 0.30 cm.  Thyroid lobes demonstrate normal blood flow by color Doppler.     There are bilateral cystic and solid nodules.  Dominant nodule on the right measures 9 x 7 x 8 mm.  Dominant nodule on the left measures 12 x 6 x 8 mm.  There is a chronic solid hypoechoic nodule of the isthmus measuring 13 x 4 x 8 mm.  This is a TI-RADS 4 nodule and meets criteria for ultrasound follow-up.   No cervical lymphadenopathy identified.   Impression:   1. Chronic multinodular thyroid.  2. TI-RADS 4 nodule of the isthmus which meets criteria for ultrasound follow-up.     Recommend repeat at 1,2,3,5 years (2025,2026, 2027, 2029)    Assessment & Plan:  Has not had her us yet      5. Paresthesia  -     Vitamin B12; Future; Expected date: 04/25/2025  -     EMG W/ ULTRASOUND AND NERVE CONDUCTION TEST 4 Extremities; Future    6. Prediabetes  Assessment & Plan:  Lab Results   Component Value Date    HGBA1C 4.9 04/26/2024     Was on metformin. Currently not on anything after her weight loss and was doing diet control.  She felt poorly with nausea with her metformin.   Repeat and follow  Seeing endocrinology    Orders:  -     Hemoglobin A1c; Future; Expected date: 04/25/2025  -     Microalbumin/Creatinine Ratio, Urine; Future; Expected date: 04/25/2025    7. Primary hypertension  Assessment & Plan:  Stable. Continue current medications and regular followup.  Hypertension Medications              amLODIPine (NORVASC) 5 MG tablet TAKE 1 TABLET BY MOUTH EVERY DAY    propranoloL  (INDERAL) 10 MG tablet Take 1 tablet (10 mg total) by mouth 3 (three) times daily.              Orders:  -     CBC Without Differential; Future; Expected date: 04/25/2025  -     Comprehensive Metabolic Panel; Future; Expected date: 04/25/2025    8. Anxiety  Assessment & Plan:  Stable on otc supplement        9. Moderate episode of recurrent major depressive disorder  Assessment & Plan:  Stable on otc supplement      10. Vitamin D deficiency  Assessment & Plan:  Taking her supplement    Orders:  -     Vitamin D; Future; Expected date: 04/25/2025    11. Encounter for screening for malignant neoplasm of breast, unspecified screening modality  -     Mammo Digital Screening Bilat w/ Felix (XPD); Future; Expected date: 04/25/2025    12. Screening for colon cancer  -     Fecal Immunochemical Test (iFOBT); Future; Expected date: 04/25/2025         This note was generated with the assistance of ambient listening technology. Verbal consent was obtained by the patient and accompanying visitor(s) for the recording of patient appointment to facilitate this note. I attest to having reviewed and edited the generated note for accuracy, though some syntax or spelling errors may persist. Please contact the author of this note for any clarification.                    [1]   Patient Active Problem List  Diagnosis    Anxiety    Hashimoto's thyroiditis    Multinodular goiter    Class 2 obesity without serious comorbidity with body mass index (BMI) of 39.0 to 39.9 in adult    Moderate episode of recurrent major depressive disorder    Prediabetes    Primary hypertension    IUD (intrauterine device) in place    Vitamin D deficiency

## 2025-04-26 ENCOUNTER — RESULTS FOLLOW-UP (OUTPATIENT)
Dept: FAMILY MEDICINE | Facility: CLINIC | Age: 45
End: 2025-04-26

## 2025-04-30 ENCOUNTER — TELEPHONE (OUTPATIENT)
Dept: PHYSICAL MEDICINE AND REHAB | Facility: CLINIC | Age: 45
End: 2025-04-30
Payer: COMMERCIAL

## 2025-05-06 ENCOUNTER — TELEPHONE (OUTPATIENT)
Dept: PHYSICAL MEDICINE AND REHAB | Facility: CLINIC | Age: 45
End: 2025-05-06
Payer: COMMERCIAL

## 2025-07-24 ENCOUNTER — PATIENT MESSAGE (OUTPATIENT)
Dept: ADMINISTRATIVE | Facility: HOSPITAL | Age: 45
End: 2025-07-24
Payer: COMMERCIAL

## 2025-08-10 DIAGNOSIS — I10 PRIMARY HYPERTENSION: ICD-10-CM

## 2025-08-10 RX ORDER — AMLODIPINE BESYLATE 5 MG/1
5 TABLET ORAL
Qty: 90 TABLET | Refills: 2 | Status: SHIPPED | OUTPATIENT
Start: 2025-08-10